# Patient Record
Sex: FEMALE | Race: WHITE | NOT HISPANIC OR LATINO
[De-identification: names, ages, dates, MRNs, and addresses within clinical notes are randomized per-mention and may not be internally consistent; named-entity substitution may affect disease eponyms.]

---

## 2017-10-22 ENCOUNTER — TRANSCRIPTION ENCOUNTER (OUTPATIENT)
Age: 27
End: 2017-10-22

## 2017-10-22 ENCOUNTER — INPATIENT (INPATIENT)
Facility: HOSPITAL | Age: 27
LOS: 1 days | Discharge: HOME | End: 2017-10-24
Attending: SPECIALIST | Admitting: SPECIALIST

## 2017-10-22 VITALS — HEIGHT: 65 IN | WEIGHT: 165.35 LBS

## 2017-10-22 DIAGNOSIS — O26.899 OTHER SPECIFIED PREGNANCY RELATED CONDITIONS, UNSPECIFIED TRIMESTER: ICD-10-CM

## 2017-10-22 DIAGNOSIS — Z3A.00 WEEKS OF GESTATION OF PREGNANCY NOT SPECIFIED: ICD-10-CM

## 2017-10-22 LAB
BASOPHILS # BLD AUTO: 0.01 K/UL — SIGNIFICANT CHANGE UP (ref 0–0.2)
BASOPHILS NFR BLD AUTO: 0.1 % — SIGNIFICANT CHANGE UP (ref 0–2)
BLD GP AB SCN SERPL QL: NEGATIVE — SIGNIFICANT CHANGE UP
EOSINOPHIL # BLD AUTO: 0.02 K/UL — SIGNIFICANT CHANGE UP (ref 0–0.5)
EOSINOPHIL NFR BLD AUTO: 0.2 % — SIGNIFICANT CHANGE UP (ref 0–6)
HCT VFR BLD CALC: 32.5 % — LOW (ref 34.5–45)
HGB BLD-MCNC: 10.2 G/DL — LOW (ref 11.5–15.5)
IMM GRANULOCYTES # BLD AUTO: 0.07 # — SIGNIFICANT CHANGE UP
IMM GRANULOCYTES NFR BLD AUTO: 0.6 % — SIGNIFICANT CHANGE UP (ref 0–1.5)
LYMPHOCYTES # BLD AUTO: 1.83 K/UL — SIGNIFICANT CHANGE UP (ref 1–3.3)
LYMPHOCYTES # BLD AUTO: 16.8 % — SIGNIFICANT CHANGE UP (ref 13–44)
MCHC RBC-ENTMCNC: 24.2 PG — LOW (ref 27–34)
MCHC RBC-ENTMCNC: 31.4 % — LOW (ref 32–36)
MCV RBC AUTO: 77 FL — LOW (ref 80–100)
MONOCYTES # BLD AUTO: 0.61 K/UL — SIGNIFICANT CHANGE UP (ref 0–0.9)
MONOCYTES NFR BLD AUTO: 5.6 % — SIGNIFICANT CHANGE UP (ref 2–14)
NEUTROPHILS # BLD AUTO: 8.35 K/UL — HIGH (ref 1.8–7.4)
NEUTROPHILS NFR BLD AUTO: 76.7 % — SIGNIFICANT CHANGE UP (ref 43–77)
NRBC # FLD: 0 — SIGNIFICANT CHANGE UP
PLATELET # BLD AUTO: 178 K/UL — SIGNIFICANT CHANGE UP (ref 150–400)
PMV BLD: SIGNIFICANT CHANGE UP FL (ref 7–13)
RBC # BLD: 4.22 M/UL — SIGNIFICANT CHANGE UP (ref 3.8–5.2)
RBC # FLD: 14 % — SIGNIFICANT CHANGE UP (ref 10.3–14.5)
RH IG SCN BLD-IMP: POSITIVE — SIGNIFICANT CHANGE UP
RH IG SCN BLD-IMP: POSITIVE — SIGNIFICANT CHANGE UP
WBC # BLD: 10.89 K/UL — HIGH (ref 3.8–10.5)
WBC # FLD AUTO: 10.89 K/UL — HIGH (ref 3.8–10.5)

## 2017-10-22 RX ORDER — OXYTOCIN 10 UNIT/ML
333.33 VIAL (ML) INJECTION
Qty: 20 | Refills: 0 | Status: COMPLETED | OUTPATIENT
Start: 2017-10-22 | End: 2017-10-22

## 2017-10-22 RX ORDER — OXYCODONE HYDROCHLORIDE 5 MG/1
5 TABLET ORAL EVERY 4 HOURS
Qty: 0 | Refills: 0 | Status: DISCONTINUED | OUTPATIENT
Start: 2017-10-22 | End: 2017-10-24

## 2017-10-22 RX ORDER — ACETAMINOPHEN 500 MG
975 TABLET ORAL EVERY 6 HOURS
Qty: 0 | Refills: 0 | Status: COMPLETED | OUTPATIENT
Start: 2017-10-22 | End: 2018-09-20

## 2017-10-22 RX ORDER — CITRIC ACID/SODIUM CITRATE 300-500 MG
15 SOLUTION, ORAL ORAL EVERY 4 HOURS
Qty: 0 | Refills: 0 | Status: DISCONTINUED | OUTPATIENT
Start: 2017-10-22 | End: 2017-10-22

## 2017-10-22 RX ORDER — AER TRAVELER 0.5 G/1
1 SOLUTION RECTAL; TOPICAL EVERY 4 HOURS
Qty: 0 | Refills: 0 | Status: DISCONTINUED | OUTPATIENT
Start: 2017-10-22 | End: 2017-10-24

## 2017-10-22 RX ORDER — DIBUCAINE 1 %
1 OINTMENT (GRAM) RECTAL EVERY 4 HOURS
Qty: 0 | Refills: 0 | Status: DISCONTINUED | OUTPATIENT
Start: 2017-10-22 | End: 2017-10-22

## 2017-10-22 RX ORDER — MAGNESIUM HYDROXIDE 400 MG/1
30 TABLET, CHEWABLE ORAL
Qty: 0 | Refills: 0 | Status: DISCONTINUED | OUTPATIENT
Start: 2017-10-22 | End: 2017-10-24

## 2017-10-22 RX ORDER — PRAMOXINE HYDROCHLORIDE 150 MG/15G
1 AEROSOL, FOAM RECTAL EVERY 4 HOURS
Qty: 0 | Refills: 0 | Status: DISCONTINUED | OUTPATIENT
Start: 2017-10-22 | End: 2017-10-22

## 2017-10-22 RX ORDER — GLYCERIN ADULT
1 SUPPOSITORY, RECTAL RECTAL AT BEDTIME
Qty: 0 | Refills: 0 | Status: DISCONTINUED | OUTPATIENT
Start: 2017-10-22 | End: 2017-10-24

## 2017-10-22 RX ORDER — AMPICILLIN TRIHYDRATE 250 MG
CAPSULE ORAL
Qty: 0 | Refills: 0 | Status: DISCONTINUED | OUTPATIENT
Start: 2017-10-22 | End: 2017-10-22

## 2017-10-22 RX ORDER — IBUPROFEN 200 MG
600 TABLET ORAL EVERY 6 HOURS
Qty: 0 | Refills: 0 | Status: COMPLETED | OUTPATIENT
Start: 2017-10-22 | End: 2018-09-20

## 2017-10-22 RX ORDER — LANOLIN
1 OINTMENT (GRAM) TOPICAL EVERY 6 HOURS
Qty: 0 | Refills: 0 | Status: DISCONTINUED | OUTPATIENT
Start: 2017-10-22 | End: 2017-10-24

## 2017-10-22 RX ORDER — SIMETHICONE 80 MG/1
80 TABLET, CHEWABLE ORAL EVERY 6 HOURS
Qty: 0 | Refills: 0 | Status: DISCONTINUED | OUTPATIENT
Start: 2017-10-22 | End: 2017-10-24

## 2017-10-22 RX ORDER — PRAMOXINE HYDROCHLORIDE 150 MG/15G
1 AEROSOL, FOAM RECTAL EVERY 4 HOURS
Qty: 0 | Refills: 0 | Status: DISCONTINUED | OUTPATIENT
Start: 2017-10-22 | End: 2017-10-23

## 2017-10-22 RX ORDER — SODIUM CHLORIDE 9 MG/ML
1000 INJECTION, SOLUTION INTRAVENOUS ONCE
Qty: 0 | Refills: 0 | Status: DISCONTINUED | OUTPATIENT
Start: 2017-10-22 | End: 2017-10-22

## 2017-10-22 RX ORDER — HYDROCORTISONE 1 %
1 OINTMENT (GRAM) TOPICAL EVERY 4 HOURS
Qty: 0 | Refills: 0 | Status: DISCONTINUED | OUTPATIENT
Start: 2017-10-22 | End: 2017-10-23

## 2017-10-22 RX ORDER — ACETAMINOPHEN 500 MG
975 TABLET ORAL EVERY 6 HOURS
Qty: 0 | Refills: 0 | Status: DISCONTINUED | OUTPATIENT
Start: 2017-10-22 | End: 2017-10-24

## 2017-10-22 RX ORDER — AER TRAVELER 0.5 G/1
1 SOLUTION RECTAL; TOPICAL EVERY 4 HOURS
Qty: 0 | Refills: 0 | Status: DISCONTINUED | OUTPATIENT
Start: 2017-10-22 | End: 2017-10-22

## 2017-10-22 RX ORDER — AMPICILLIN TRIHYDRATE 250 MG
1 CAPSULE ORAL EVERY 4 HOURS
Qty: 0 | Refills: 0 | Status: DISCONTINUED | OUTPATIENT
Start: 2017-10-22 | End: 2017-10-23

## 2017-10-22 RX ORDER — AMPICILLIN TRIHYDRATE 250 MG
CAPSULE ORAL
Qty: 0 | Refills: 0 | Status: DISCONTINUED | OUTPATIENT
Start: 2017-10-22 | End: 2017-10-23

## 2017-10-22 RX ORDER — DIPHENHYDRAMINE HCL 50 MG
25 CAPSULE ORAL EVERY 6 HOURS
Qty: 0 | Refills: 0 | Status: DISCONTINUED | OUTPATIENT
Start: 2017-10-22 | End: 2017-10-24

## 2017-10-22 RX ORDER — HYDROCORTISONE 1 %
1 OINTMENT (GRAM) TOPICAL EVERY 4 HOURS
Qty: 0 | Refills: 0 | Status: DISCONTINUED | OUTPATIENT
Start: 2017-10-22 | End: 2017-10-22

## 2017-10-22 RX ORDER — DOCUSATE SODIUM 100 MG
100 CAPSULE ORAL
Qty: 0 | Refills: 0 | Status: DISCONTINUED | OUTPATIENT
Start: 2017-10-22 | End: 2017-10-24

## 2017-10-22 RX ORDER — AMPICILLIN TRIHYDRATE 250 MG
2 CAPSULE ORAL ONCE
Qty: 0 | Refills: 0 | Status: COMPLETED | OUTPATIENT
Start: 2017-10-22 | End: 2017-10-22

## 2017-10-22 RX ORDER — OXYTOCIN 10 UNIT/ML
41.67 VIAL (ML) INJECTION
Qty: 20 | Refills: 0 | Status: DISCONTINUED | OUTPATIENT
Start: 2017-10-22 | End: 2017-10-22

## 2017-10-22 RX ORDER — SODIUM CHLORIDE 9 MG/ML
1000 INJECTION, SOLUTION INTRAVENOUS
Qty: 0 | Refills: 0 | Status: DISCONTINUED | OUTPATIENT
Start: 2017-10-22 | End: 2017-10-22

## 2017-10-22 RX ORDER — TETANUS TOXOID, REDUCED DIPHTHERIA TOXOID AND ACELLULAR PERTUSSIS VACCINE, ADSORBED 5; 2.5; 8; 8; 2.5 [IU]/.5ML; [IU]/.5ML; UG/.5ML; UG/.5ML; UG/.5ML
0.5 SUSPENSION INTRAMUSCULAR ONCE
Qty: 0 | Refills: 0 | Status: DISCONTINUED | OUTPATIENT
Start: 2017-10-22 | End: 2017-10-24

## 2017-10-22 RX ORDER — IBUPROFEN 200 MG
600 TABLET ORAL EVERY 6 HOURS
Qty: 0 | Refills: 0 | Status: DISCONTINUED | OUTPATIENT
Start: 2017-10-22 | End: 2017-10-24

## 2017-10-22 RX ORDER — SODIUM CHLORIDE 9 MG/ML
3 INJECTION INTRAMUSCULAR; INTRAVENOUS; SUBCUTANEOUS EVERY 8 HOURS
Qty: 0 | Refills: 0 | Status: DISCONTINUED | OUTPATIENT
Start: 2017-10-22 | End: 2017-10-22

## 2017-10-22 RX ORDER — OXYTOCIN 10 UNIT/ML
333.33 VIAL (ML) INJECTION
Qty: 20 | Refills: 0 | Status: COMPLETED | OUTPATIENT
Start: 2017-10-22

## 2017-10-22 RX ORDER — OXYCODONE HYDROCHLORIDE 5 MG/1
5 TABLET ORAL
Qty: 0 | Refills: 0 | Status: DISCONTINUED | OUTPATIENT
Start: 2017-10-22 | End: 2017-10-24

## 2017-10-22 RX ORDER — SODIUM CHLORIDE 9 MG/ML
3 INJECTION INTRAMUSCULAR; INTRAVENOUS; SUBCUTANEOUS EVERY 8 HOURS
Qty: 0 | Refills: 0 | Status: DISCONTINUED | OUTPATIENT
Start: 2017-10-22 | End: 2017-10-23

## 2017-10-22 RX ORDER — KETOROLAC TROMETHAMINE 30 MG/ML
30 SYRINGE (ML) INJECTION ONCE
Qty: 0 | Refills: 0 | Status: DISCONTINUED | OUTPATIENT
Start: 2017-10-22 | End: 2017-10-23

## 2017-10-22 RX ORDER — DIBUCAINE 1 %
1 OINTMENT (GRAM) RECTAL EVERY 4 HOURS
Qty: 0 | Refills: 0 | Status: DISCONTINUED | OUTPATIENT
Start: 2017-10-22 | End: 2017-10-24

## 2017-10-22 RX ORDER — INFLUENZA VIRUS VACCINE 15; 15; 15; 15 UG/.5ML; UG/.5ML; UG/.5ML; UG/.5ML
0.5 SUSPENSION INTRAMUSCULAR ONCE
Qty: 0 | Refills: 0 | Status: DISCONTINUED | OUTPATIENT
Start: 2017-10-22 | End: 2017-10-24

## 2017-10-22 RX ORDER — OXYTOCIN 10 UNIT/ML
41.67 VIAL (ML) INJECTION
Qty: 20 | Refills: 0 | Status: DISCONTINUED | OUTPATIENT
Start: 2017-10-22 | End: 2017-10-23

## 2017-10-22 RX ADMIN — SODIUM CHLORIDE 125 MILLILITER(S): 9 INJECTION, SOLUTION INTRAVENOUS at 19:32

## 2017-10-22 RX ADMIN — Medication 208 GRAM(S): at 20:15

## 2017-10-22 RX ADMIN — Medication 216 GRAM(S): at 15:18

## 2017-10-22 RX ADMIN — SODIUM CHLORIDE 125 MILLILITER(S): 9 INJECTION, SOLUTION INTRAVENOUS at 15:41

## 2017-10-22 RX ADMIN — Medication 1000 MILLIUNIT(S)/MIN: at 21:39

## 2017-10-22 RX ADMIN — Medication 125 MILLIUNIT(S)/MIN: at 21:38

## 2017-10-22 NOTE — DISCHARGE NOTE OB - PATIENT PORTAL LINK FT
“You can access the FollowHealth Patient Portal, offered by [facility], by registering with the following website: [email or web address]”

## 2017-10-22 NOTE — DISCHARGE NOTE OB - MATERIALS PROVIDED
Guide to Postpartum Care/[place]  Immunization Record/Breastfeeding Log/MMR Vaccination (VIS Pub Date: 2012)/Tdap Vaccination (VIS Pub Date: 2012)/Birth Certificate Instructions/[facility] Hearing Screen Program/Shaken Baby Prevention Handout/Vaccinations/[facility] [place] Screening Program

## 2017-10-22 NOTE — DISCHARGE NOTE OB - CARE PROVIDER_API CALL
Naman Marinelli (MD), Obstetrics and Gynecology  [address] [place] [address]  Phone: [phone]  Fax: [phone]

## 2017-10-23 LAB — T PALLIDUM AB TITR SER: NEGATIVE — SIGNIFICANT CHANGE UP

## 2017-10-23 RX ORDER — ACETAMINOPHEN 500 MG
3 TABLET ORAL
Qty: 0 | Refills: 0 | DISCHARGE
Start: 2017-10-23

## 2017-10-23 RX ORDER — PRAMOXINE HYDROCHLORIDE 150 MG/15G
1 AEROSOL, FOAM RECTAL EVERY 4 HOURS
Qty: 0 | Refills: 0 | Status: DISCONTINUED | OUTPATIENT
Start: 2017-10-23 | End: 2017-10-24

## 2017-10-23 RX ORDER — HYDROCORTISONE 1 %
1 OINTMENT (GRAM) TOPICAL EVERY 4 HOURS
Qty: 0 | Refills: 0 | Status: DISCONTINUED | OUTPATIENT
Start: 2017-10-23 | End: 2017-10-24

## 2017-10-23 RX ORDER — IBUPROFEN 200 MG
1 TABLET ORAL
Qty: 0 | Refills: 0 | DISCHARGE
Start: 2017-10-23

## 2017-10-23 RX ADMIN — Medication 975 MILLIGRAM(S): at 15:45

## 2017-10-23 RX ADMIN — Medication 600 MILLIGRAM(S): at 02:40

## 2017-10-23 RX ADMIN — SODIUM CHLORIDE 3 MILLILITER(S): 9 INJECTION INTRAMUSCULAR; INTRAVENOUS; SUBCUTANEOUS at 06:51

## 2017-10-23 RX ADMIN — Medication 600 MILLIGRAM(S): at 09:00

## 2017-10-23 RX ADMIN — Medication 975 MILLIGRAM(S): at 08:30

## 2017-10-23 RX ADMIN — Medication 600 MILLIGRAM(S): at 15:19

## 2017-10-23 RX ADMIN — Medication 600 MILLIGRAM(S): at 21:30

## 2017-10-23 RX ADMIN — Medication 600 MILLIGRAM(S): at 15:45

## 2017-10-23 RX ADMIN — Medication 600 MILLIGRAM(S): at 08:30

## 2017-10-23 RX ADMIN — Medication 975 MILLIGRAM(S): at 09:00

## 2017-10-23 RX ADMIN — Medication 975 MILLIGRAM(S): at 21:30

## 2017-10-23 RX ADMIN — Medication 975 MILLIGRAM(S): at 21:00

## 2017-10-23 RX ADMIN — Medication 975 MILLIGRAM(S): at 15:19

## 2017-10-23 RX ADMIN — Medication 1 TABLET(S): at 08:29

## 2017-10-23 RX ADMIN — Medication 600 MILLIGRAM(S): at 02:05

## 2017-10-23 RX ADMIN — Medication 600 MILLIGRAM(S): at 21:00

## 2017-10-23 RX ADMIN — Medication 100 MILLIGRAM(S): at 06:20

## 2017-10-23 NOTE — PROGRESS NOTE ADULT - SUBJECTIVE AND OBJECTIVE BOX
S: Patient doing well. Minimal lochia. Pain controlled. breastfeeding    O: Vital Signs Last 24 Hrs  T(C): 36.7 (23 Oct 2017 06:00), Max: 37 (22 Oct 2017 21:00)  T(F): 98 (23 Oct 2017 06:00), Max: 98.6 (22 Oct 2017 21:00)  HR: 75 (23 Oct 2017 06:00) (75 - 100)  BP: 106/65 (23 Oct 2017 06:00) (106/65 - 120/80)  BP(mean): --  RR: 16 (23 Oct 2017 06:00) (16 - 18)  SpO2: 100% (23 Oct 2017 06:00) (97% - 100%)    Gen: NAD  Abd: soft, NT, ND, fundus firm below umbilicus  Lochia: moderate  Ext: no tenderness    Labs:                        10.2   10.89 )-----------( 178      ( 22 Oct 2017 15:08 )             32.5       ABO Interpretation: A (10-22 @ 15:36)    Rh Interpretation: Positive (10-22 @ 15:36)    Antibody Screen Negative      A: 27y PPD# s/p1  doing well.     Plan: Continue routine postpartum care.

## 2017-10-24 VITALS
OXYGEN SATURATION: 99 % | SYSTOLIC BLOOD PRESSURE: 113 MMHG | DIASTOLIC BLOOD PRESSURE: 65 MMHG | RESPIRATION RATE: 18 BRPM | HEART RATE: 86 BPM | TEMPERATURE: 98 F

## 2017-10-24 RX ADMIN — Medication 975 MILLIGRAM(S): at 03:45

## 2017-10-24 RX ADMIN — Medication 600 MILLIGRAM(S): at 03:45

## 2017-10-24 RX ADMIN — Medication 975 MILLIGRAM(S): at 11:13

## 2017-10-24 RX ADMIN — Medication 600 MILLIGRAM(S): at 04:15

## 2017-10-24 RX ADMIN — Medication 600 MILLIGRAM(S): at 11:14

## 2017-10-24 RX ADMIN — Medication 100 MILLIGRAM(S): at 03:45

## 2017-10-24 RX ADMIN — Medication 975 MILLIGRAM(S): at 04:15

## 2019-03-12 NOTE — DISCHARGE NOTE OB - NS OB DC MMR REASON NOT RECEIVED
Pt is requesting office change to [place]  Please send over records for Dr Sea Pastrana to review  Contraindicated

## 2019-04-02 PROBLEM — Z00.00 ENCOUNTER FOR PREVENTIVE HEALTH EXAMINATION: Status: ACTIVE | Noted: 2019-04-02

## 2019-05-02 ENCOUNTER — APPOINTMENT (OUTPATIENT)
Dept: ANTEPARTUM | Facility: CLINIC | Age: 29
End: 2019-05-02
Payer: COMMERCIAL

## 2019-05-02 ENCOUNTER — ASOB RESULT (OUTPATIENT)
Age: 29
End: 2019-05-02

## 2019-05-02 PROCEDURE — 36416 COLLJ CAPILLARY BLOOD SPEC: CPT

## 2019-05-02 PROCEDURE — 76801 OB US < 14 WKS SINGLE FETUS: CPT

## 2019-05-02 PROCEDURE — 76813 OB US NUCHAL MEAS 1 GEST: CPT

## 2019-06-11 ENCOUNTER — APPOINTMENT (OUTPATIENT)
Dept: ANTEPARTUM | Facility: CLINIC | Age: 29
End: 2019-06-11
Payer: COMMERCIAL

## 2019-06-11 ENCOUNTER — ASOB RESULT (OUTPATIENT)
Age: 29
End: 2019-06-11

## 2019-06-11 PROCEDURE — 76811 OB US DETAILED SNGL FETUS: CPT

## 2019-06-17 ENCOUNTER — APPOINTMENT (OUTPATIENT)
Dept: ANTEPARTUM | Facility: CLINIC | Age: 29
End: 2019-06-17

## 2019-09-17 ENCOUNTER — ASOB RESULT (OUTPATIENT)
Age: 29
End: 2019-09-17

## 2019-09-17 ENCOUNTER — APPOINTMENT (OUTPATIENT)
Dept: ANTEPARTUM | Facility: CLINIC | Age: 29
End: 2019-09-17
Payer: COMMERCIAL

## 2019-09-17 ENCOUNTER — OUTPATIENT (OUTPATIENT)
Dept: OUTPATIENT SERVICES | Facility: HOSPITAL | Age: 29
LOS: 1 days | Discharge: HOME | End: 2019-09-17

## 2019-09-17 VITALS
SYSTOLIC BLOOD PRESSURE: 118 MMHG | TEMPERATURE: 98 F | RESPIRATION RATE: 16 BRPM | HEART RATE: 90 BPM | OXYGEN SATURATION: 99 % | DIASTOLIC BLOOD PRESSURE: 74 MMHG

## 2019-09-17 VITALS — DIASTOLIC BLOOD PRESSURE: 58 MMHG | SYSTOLIC BLOOD PRESSURE: 97 MMHG | HEART RATE: 75 BPM

## 2019-09-17 DIAGNOSIS — O26.899 OTHER SPECIFIED PREGNANCY RELATED CONDITIONS, UNSPECIFIED TRIMESTER: ICD-10-CM

## 2019-09-17 DIAGNOSIS — Z3A.00 WEEKS OF GESTATION OF PREGNANCY NOT SPECIFIED: ICD-10-CM

## 2019-09-17 LAB
APPEARANCE UR: CLEAR — SIGNIFICANT CHANGE UP
BACTERIA # UR AUTO: NEGATIVE — SIGNIFICANT CHANGE UP
BILIRUB UR-MCNC: NEGATIVE — SIGNIFICANT CHANGE UP
BLOOD UR QL VISUAL: NEGATIVE — SIGNIFICANT CHANGE UP
COLOR SPEC: SIGNIFICANT CHANGE UP
GLUCOSE UR-MCNC: NEGATIVE — SIGNIFICANT CHANGE UP
HYALINE CASTS # UR AUTO: NEGATIVE — SIGNIFICANT CHANGE UP
KETONES UR-MCNC: NEGATIVE — SIGNIFICANT CHANGE UP
LEUKOCYTE ESTERASE UR-ACNC: SIGNIFICANT CHANGE UP
NITRITE UR-MCNC: NEGATIVE — SIGNIFICANT CHANGE UP
PH UR: 7 — SIGNIFICANT CHANGE UP (ref 5–8)
PROT UR-MCNC: NEGATIVE — SIGNIFICANT CHANGE UP
RBC CASTS # UR COMP ASSIST: SIGNIFICANT CHANGE UP (ref 0–?)
SP GR SPEC: 1.01 — SIGNIFICANT CHANGE UP (ref 1–1.04)
SQUAMOUS # UR AUTO: SIGNIFICANT CHANGE UP
UROBILINOGEN FLD QL: NORMAL — SIGNIFICANT CHANGE UP
WBC UR QL: HIGH (ref 0–?)

## 2019-09-17 PROCEDURE — 76819 FETAL BIOPHYS PROFIL W/O NST: CPT

## 2019-09-17 PROCEDURE — 76816 OB US FOLLOW-UP PER FETUS: CPT

## 2019-09-17 NOTE — OB PROVIDER TRIAGE NOTE - HISTORY OF PRESENT ILLNESS
30 yo @ 33.1 wks  presents with c/o B/L lower back pain starting at approx. 0700 this AM and continuing intermittently x 2 hours resolving around 0900.   Patient denies any CTX,  LOF, VB, and reports good FM's  PNC: Dr Marinelli

## 2019-09-17 NOTE — OB PROVIDER TRIAGE NOTE - NSHPPHYSICALEXAM_GEN_ALL_CORE
A/O x 3/ NAD  ICU Vital Signs Last 24 Hrs  T(C): 36.8 (17 Sep 2019 09:38), Max: 36.8 (17 Sep 2019 09:11)  T(F): 98.24 (17 Sep 2019 09:38), Max: 98.24 (17 Sep 2019 09:38)  HR: 80 (17 Sep 2019 09:37) (80 - 90)  BP: 114/75 (17 Sep 2019 09:37) (114/75 - 118/74)  BP(mean): --  ABP: --  ABP(mean): --  RR: 16 (17 Sep 2019 09:11) (16 - 16)  SpO2: 99% (17 Sep 2019 09:11) (99% - 99%)  Abdomen is soft NT and gravid with no ctx palpated   B/L Negative CVAT  SSE: No Pooling, Cervix appears closed  TVS = 4.0 cm with no dynamic change  NST in Progress

## 2019-09-17 NOTE — OB PROVIDER TRIAGE NOTE - NSHPLABSRESULTS_GEN_ALL_CORE
U/A U/A  Urinalysis Basic - ( 17 Sep 2019 09:42 )    Color: LIGHT YELLOW / Appearance: CLEAR / S.009 / pH: 7.0  Gluc: NEGATIVE / Ketone: NEGATIVE  / Bili: NEGATIVE / Urobili: NORMAL   Blood: NEGATIVE / Protein: NEGATIVE / Nitrite: NEGATIVE   Leuk Esterase: SMALL / RBC: 0-2 / WBC 6-10   Sq Epi: FEW / Non Sq Epi: x / Bacteria: NEGATIVE      Urine Culture sent , will follow up with patient or Primary OB if abnormal    FHR: Cat 1, Reactive  TOCO: NO CTX

## 2019-09-17 NOTE — OB PROVIDER TRIAGE NOTE - NSOBPROVIDERNOTE_OBGYN_ALL_OB_FT
30 yo @ wks GA with c/o B/L intermittent lower back pain x approx.. 2 hours this AM which has since resolved upon arriving here in DT.   NST in Progress  TVS = 4.0 CM   U/A sent 28 yo @ wks GA with c/o B/L intermittent lower back pain x approx.. 2 hours this AM which has since resolved upon arriving here in DT.   NST in Progress  TVS = 4.0 CM   U/A sent with Urine CX    - No evidence of PTL at this time or any acute pathology  - Plan for Discharge home with precautions and advised to increase PO Fluids  - A/P DW DR Russell (OB Covering Attending)

## 2019-09-18 LAB — SPECIMEN SOURCE: SIGNIFICANT CHANGE UP

## 2019-09-19 LAB — BACTERIA UR CULT: SIGNIFICANT CHANGE UP

## 2019-10-29 ENCOUNTER — INPATIENT (INPATIENT)
Facility: HOSPITAL | Age: 29
LOS: 1 days | Discharge: HOME | End: 2019-10-31
Attending: SPECIALIST | Admitting: SPECIALIST

## 2019-10-29 VITALS
TEMPERATURE: 98 F | DIASTOLIC BLOOD PRESSURE: 79 MMHG | SYSTOLIC BLOOD PRESSURE: 121 MMHG | HEART RATE: 93 BPM | RESPIRATION RATE: 1 BRPM

## 2019-10-29 DIAGNOSIS — O26.899 OTHER SPECIFIED PREGNANCY RELATED CONDITIONS, UNSPECIFIED TRIMESTER: ICD-10-CM

## 2019-10-29 DIAGNOSIS — Z3A.00 WEEKS OF GESTATION OF PREGNANCY NOT SPECIFIED: ICD-10-CM

## 2019-10-29 LAB
BASOPHILS # BLD AUTO: 0.03 K/UL — SIGNIFICANT CHANGE UP (ref 0–0.2)
BASOPHILS NFR BLD AUTO: 0.3 % — SIGNIFICANT CHANGE UP (ref 0–2)
BLD GP AB SCN SERPL QL: NEGATIVE — SIGNIFICANT CHANGE UP
EOSINOPHIL # BLD AUTO: 0.03 K/UL — SIGNIFICANT CHANGE UP (ref 0–0.5)
EOSINOPHIL NFR BLD AUTO: 0.3 % — SIGNIFICANT CHANGE UP (ref 0–6)
HCT VFR BLD CALC: 31.7 % — LOW (ref 34.5–45)
HGB BLD-MCNC: 9.8 G/DL — LOW (ref 11.5–15.5)
IMM GRANULOCYTES NFR BLD AUTO: 0.6 % — SIGNIFICANT CHANGE UP (ref 0–1.5)
LYMPHOCYTES # BLD AUTO: 1.98 K/UL — SIGNIFICANT CHANGE UP (ref 1–3.3)
LYMPHOCYTES # BLD AUTO: 18 % — SIGNIFICANT CHANGE UP (ref 13–44)
MCHC RBC-ENTMCNC: 23.1 PG — LOW (ref 27–34)
MCHC RBC-ENTMCNC: 30.9 % — LOW (ref 32–36)
MCV RBC AUTO: 74.6 FL — LOW (ref 80–100)
MONOCYTES # BLD AUTO: 0.78 K/UL — SIGNIFICANT CHANGE UP (ref 0–0.9)
MONOCYTES NFR BLD AUTO: 7.1 % — SIGNIFICANT CHANGE UP (ref 2–14)
NEUTROPHILS # BLD AUTO: 8.11 K/UL — HIGH (ref 1.8–7.4)
NEUTROPHILS NFR BLD AUTO: 73.7 % — SIGNIFICANT CHANGE UP (ref 43–77)
NRBC # FLD: 0 K/UL — SIGNIFICANT CHANGE UP (ref 0–0)
PLATELET # BLD AUTO: 201 K/UL — SIGNIFICANT CHANGE UP (ref 150–400)
PMV BLD: SIGNIFICANT CHANGE UP FL (ref 7–13)
RBC # BLD: 4.25 M/UL — SIGNIFICANT CHANGE UP (ref 3.8–5.2)
RBC # FLD: 14.9 % — HIGH (ref 10.3–14.5)
RH IG SCN BLD-IMP: POSITIVE — SIGNIFICANT CHANGE UP
T PALLIDUM AB TITR SER: NEGATIVE — SIGNIFICANT CHANGE UP
WBC # BLD: 11 K/UL — HIGH (ref 3.8–10.5)
WBC # FLD AUTO: 11 K/UL — HIGH (ref 3.8–10.5)

## 2019-10-29 RX ORDER — AER TRAVELER 0.5 G/1
1 SOLUTION RECTAL; TOPICAL EVERY 4 HOURS
Refills: 0 | Status: DISCONTINUED | OUTPATIENT
Start: 2019-10-29 | End: 2019-10-31

## 2019-10-29 RX ORDER — BENZOCAINE 10 %
1 GEL (GRAM) MUCOUS MEMBRANE EVERY 6 HOURS
Refills: 0 | Status: DISCONTINUED | OUTPATIENT
Start: 2019-10-29 | End: 2019-10-31

## 2019-10-29 RX ORDER — IBUPROFEN 200 MG
600 TABLET ORAL EVERY 6 HOURS
Refills: 0 | Status: DISCONTINUED | OUTPATIENT
Start: 2019-10-29 | End: 2019-10-31

## 2019-10-29 RX ORDER — SIMETHICONE 80 MG/1
80 TABLET, CHEWABLE ORAL EVERY 4 HOURS
Refills: 0 | Status: DISCONTINUED | OUTPATIENT
Start: 2019-10-29 | End: 2019-10-31

## 2019-10-29 RX ORDER — HYDROCORTISONE 1 %
1 OINTMENT (GRAM) TOPICAL EVERY 6 HOURS
Refills: 0 | Status: DISCONTINUED | OUTPATIENT
Start: 2019-10-29 | End: 2019-10-31

## 2019-10-29 RX ORDER — GLYCERIN ADULT
1 SUPPOSITORY, RECTAL RECTAL AT BEDTIME
Refills: 0 | Status: DISCONTINUED | OUTPATIENT
Start: 2019-10-29 | End: 2019-10-31

## 2019-10-29 RX ORDER — INFLUENZA VIRUS VACCINE 15; 15; 15; 15 UG/.5ML; UG/.5ML; UG/.5ML; UG/.5ML
0.5 SUSPENSION INTRAMUSCULAR ONCE
Refills: 0 | Status: DISCONTINUED | OUTPATIENT
Start: 2019-10-29 | End: 2019-10-31

## 2019-10-29 RX ORDER — OXYCODONE HYDROCHLORIDE 5 MG/1
5 TABLET ORAL
Refills: 0 | Status: DISCONTINUED | OUTPATIENT
Start: 2019-10-29 | End: 2019-10-31

## 2019-10-29 RX ORDER — MAGNESIUM HYDROXIDE 400 MG/1
30 TABLET, CHEWABLE ORAL
Refills: 0 | Status: DISCONTINUED | OUTPATIENT
Start: 2019-10-29 | End: 2019-10-31

## 2019-10-29 RX ORDER — DIBUCAINE 1 %
1 OINTMENT (GRAM) RECTAL EVERY 6 HOURS
Refills: 0 | Status: DISCONTINUED | OUTPATIENT
Start: 2019-10-29 | End: 2019-10-31

## 2019-10-29 RX ORDER — OXYTOCIN 10 UNIT/ML
333.33 VIAL (ML) INJECTION
Qty: 20 | Refills: 0 | Status: DISCONTINUED | OUTPATIENT
Start: 2019-10-29 | End: 2019-10-29

## 2019-10-29 RX ORDER — SODIUM CHLORIDE 9 MG/ML
1000 INJECTION, SOLUTION INTRAVENOUS ONCE
Refills: 0 | Status: COMPLETED | OUTPATIENT
Start: 2019-10-29 | End: 2019-10-29

## 2019-10-29 RX ORDER — LANOLIN
1 OINTMENT (GRAM) TOPICAL EVERY 6 HOURS
Refills: 0 | Status: DISCONTINUED | OUTPATIENT
Start: 2019-10-29 | End: 2019-10-31

## 2019-10-29 RX ORDER — DIPHENHYDRAMINE HCL 50 MG
25 CAPSULE ORAL EVERY 6 HOURS
Refills: 0 | Status: DISCONTINUED | OUTPATIENT
Start: 2019-10-29 | End: 2019-10-31

## 2019-10-29 RX ORDER — IBUPROFEN 200 MG
600 TABLET ORAL EVERY 6 HOURS
Refills: 0 | Status: COMPLETED | OUTPATIENT
Start: 2019-10-29 | End: 2020-09-26

## 2019-10-29 RX ORDER — ACETAMINOPHEN 500 MG
975 TABLET ORAL
Refills: 0 | Status: DISCONTINUED | OUTPATIENT
Start: 2019-10-29 | End: 2019-10-31

## 2019-10-29 RX ORDER — SODIUM CHLORIDE 9 MG/ML
3 INJECTION INTRAMUSCULAR; INTRAVENOUS; SUBCUTANEOUS EVERY 8 HOURS
Refills: 0 | Status: DISCONTINUED | OUTPATIENT
Start: 2019-10-29 | End: 2019-10-31

## 2019-10-29 RX ORDER — PRAMOXINE HYDROCHLORIDE 150 MG/15G
1 AEROSOL, FOAM RECTAL EVERY 4 HOURS
Refills: 0 | Status: DISCONTINUED | OUTPATIENT
Start: 2019-10-29 | End: 2019-10-31

## 2019-10-29 RX ORDER — KETOROLAC TROMETHAMINE 30 MG/ML
30 SYRINGE (ML) INJECTION ONCE
Refills: 0 | Status: DISCONTINUED | OUTPATIENT
Start: 2019-10-29 | End: 2019-10-29

## 2019-10-29 RX ORDER — FAMOTIDINE 10 MG/ML
0 INJECTION INTRAVENOUS
Qty: 0 | Refills: 0 | DISCHARGE

## 2019-10-29 RX ORDER — SODIUM CHLORIDE 9 MG/ML
1000 INJECTION, SOLUTION INTRAVENOUS
Refills: 0 | Status: DISCONTINUED | OUTPATIENT
Start: 2019-10-29 | End: 2019-10-29

## 2019-10-29 RX ORDER — TETANUS TOXOID, REDUCED DIPHTHERIA TOXOID AND ACELLULAR PERTUSSIS VACCINE, ADSORBED 5; 2.5; 8; 8; 2.5 [IU]/.5ML; [IU]/.5ML; UG/.5ML; UG/.5ML; UG/.5ML
0.5 SUSPENSION INTRAMUSCULAR ONCE
Refills: 0 | Status: DISCONTINUED | OUTPATIENT
Start: 2019-10-29 | End: 2019-10-31

## 2019-10-29 RX ORDER — OXYCODONE HYDROCHLORIDE 5 MG/1
5 TABLET ORAL ONCE
Refills: 0 | Status: DISCONTINUED | OUTPATIENT
Start: 2019-10-29 | End: 2019-10-31

## 2019-10-29 RX ADMIN — Medication 600 MILLIGRAM(S): at 23:30

## 2019-10-29 RX ADMIN — Medication 30 MILLIGRAM(S): at 17:30

## 2019-10-29 RX ADMIN — Medication 975 MILLIGRAM(S): at 20:10

## 2019-10-29 RX ADMIN — OXYCODONE HYDROCHLORIDE 5 MILLIGRAM(S): 5 TABLET ORAL at 23:47

## 2019-10-29 RX ADMIN — Medication 1000 MILLIUNIT(S)/MIN: at 17:37

## 2019-10-29 RX ADMIN — SODIUM CHLORIDE 1000 MILLILITER(S): 9 INJECTION, SOLUTION INTRAVENOUS at 15:15

## 2019-10-29 RX ADMIN — SODIUM CHLORIDE 3 MILLILITER(S): 9 INJECTION INTRAMUSCULAR; INTRAVENOUS; SUBCUTANEOUS at 22:30

## 2019-10-29 RX ADMIN — Medication 30 MILLIGRAM(S): at 17:00

## 2019-10-29 RX ADMIN — Medication 975 MILLIGRAM(S): at 20:40

## 2019-10-29 RX ADMIN — SODIUM CHLORIDE 125 MILLILITER(S): 9 INJECTION, SOLUTION INTRAVENOUS at 11:29

## 2019-10-29 RX ADMIN — Medication 600 MILLIGRAM(S): at 22:59

## 2019-10-29 NOTE — OB PROVIDER H&P - HISTORY OF PRESENT ILLNESS
30yo  @ 39.1 wks SLIUP uncomp PNC here co lower back pain every 5-6 min apart with spotting earlier today. Pt is intact with GFM. Pt reports last VE Wed 10/23 was closed.    Pmhx-denies  Pshx/Hosp-denies  Meds-PNV  NKDA  Past [id number] FT-7#2  Gyn-denies

## 2019-10-29 NOTE — OB PROVIDER DELIVERY SUMMARY - NSPROVIDERDELIVERYNOTE_OBGYN_ALL_OB_FT
Spontaneous vaginal delivery of liveborn infant  from MICKI position. Head, shoulders, and  body delivered easily. Infant was suctioned.  No mec. Cord was clamped and cut and  infant was passed to mother. Placenta  delivered intact with a 3 vessel cord. Fundal  massage was given and uterine fundus was  found to be firm. Vaginal exam revealed an  intact cervix, vaginal walls and sulci. Patient  had a 2nd degree laceration in the perineum  that was repaired with 2.0 chromic suture.  Excellent hemostasis was noted. patient  was stable and went to recovery. Count was  correct x 2.

## 2019-10-29 NOTE — OB PROVIDER TRIAGE NOTE - NSHPPHYSICALEXAM_GEN_ALL_CORE
Abd exam-soft nontender; gravid uterus.   VE-4/80/-2/posterior    EFW~3200 Abd exam-soft nontender; gravid uterus.   VE-4/80/-2/posterior    EFW~3200    NST cat I with accels and mod variability; ctx's Q4-5 min

## 2019-10-29 NOTE — OB PROVIDER TRIAGE NOTE - HISTORY OF PRESENT ILLNESS
28yo  @ 39.1 wks SLIUP uncomp PNC here co lower back pain every 5-6 min apart with spotting earlier today. Pt is intact with GFM. Pt reports last VE Wed 10/23 was closed.    Pmhx-denies  Pshx/Hosp-denies  Meds-PNV  NKDA  Past [id number] FT-7#2  Gyn-denies

## 2019-10-29 NOTE — OB PROVIDER TRIAGE NOTE - NSOBPROVIDERNOTE_OBGYN_ALL_OB_FT
30yo  @ 39.1 wks SLIUP uncomp PNC here for rule out labor  -VE-4/80/-2/posterior  -GBS neg 28yo  @ 39.1 wks SLIUP uncomp PNC here for rule out labor  -VE-4/80/-2/posterior  -GBS neg  -Pt declining pain meds at this time. Pt does not want to ambulate.   -pt was dw dr Gallo, in to see and speak to pt. Pt admitted for labor.

## 2019-10-29 NOTE — OB RN DELIVERY SUMMARY - NS_SEPSISRSKCALC_OBGYN_ALL_OB_FT
EOS calculated successfully. EOS Risk Factor: 0.5/1000 live births ([facility] national incidence); GA=39w1d; Temp=98.24; ROM=2.2; GBS='Negative'; Antibiotics='No antibiotics or any antibiotics < 2 hrs prior to birth'

## 2019-10-29 NOTE — OB PROVIDER H&P - NSHPPHYSICALEXAM_GEN_ALL_CORE
Abd exam-soft nontender; gravid uterus.   VE-4/80/-2/posterior    EFW~3200    NST cat I with accels and mod variability; ctx's Q4-5 min Abd exam-soft nontender; gravid uterus.   VE-4/80/-2/posterior    EFW~3200    NST cat I with accels and mod variability; ctx's Q4-5 min    TAS-to confirm vtx presentation

## 2019-10-29 NOTE — OB PROVIDER H&P - ASSESSMENT
28yo  @ 39.1 wks SLIUP uncomp PNC here for rule out labor  -VE-4/80/-2/posterior  -GBS neg  -Pt declining pain meds at this time. Pt does not want to ambulate.   -pt was dw dr Gallo, in to see and speak to pt. Pt admitted for labor.

## 2019-10-30 RX ADMIN — Medication 975 MILLIGRAM(S): at 17:24

## 2019-10-30 RX ADMIN — Medication 600 MILLIGRAM(S): at 12:30

## 2019-10-30 RX ADMIN — Medication 600 MILLIGRAM(S): at 13:10

## 2019-10-30 RX ADMIN — Medication 975 MILLIGRAM(S): at 11:00

## 2019-10-30 RX ADMIN — Medication 600 MILLIGRAM(S): at 06:30

## 2019-10-30 RX ADMIN — Medication 975 MILLIGRAM(S): at 23:01

## 2019-10-30 RX ADMIN — Medication 975 MILLIGRAM(S): at 16:46

## 2019-10-30 RX ADMIN — Medication 975 MILLIGRAM(S): at 10:19

## 2019-10-30 RX ADMIN — Medication 600 MILLIGRAM(S): at 19:30

## 2019-10-30 RX ADMIN — Medication 600 MILLIGRAM(S): at 05:58

## 2019-10-30 RX ADMIN — Medication 600 MILLIGRAM(S): at 20:15

## 2019-10-30 RX ADMIN — OXYCODONE HYDROCHLORIDE 5 MILLIGRAM(S): 5 TABLET ORAL at 00:20

## 2019-10-30 RX ADMIN — Medication 975 MILLIGRAM(S): at 23:45

## 2019-10-30 NOTE — PROGRESS NOTE ADULT - SUBJECTIVE AND OBJECTIVE BOX
S: Patient doing well. Minimal lochia. Pain controlled.    O: Vital Signs Last 24 Hrs  T(C): 36.3 (30 Oct 2019 06:13), Max: 37.1 (29 Oct 2019 16:25)  T(F): 97.4 (30 Oct 2019 06:13), Max: 98.8 (29 Oct 2019 16:25)  HR: 80 (30 Oct 2019 06:13) (80 - 137)  BP: 110/73 (30 Oct 2019 06:13) (98/66 - 143/66)  BP(mean): --  RR: 17 (30 Oct 2019 06:13) (1 - 19)  SpO2: 99% (30 Oct 2019 06:13) (64% - 100%)    Gen: NAD  Abd: soft, NT, ND, fundus firm below umbilicus  Lochia: moderate  Ext: no tenderness    Labs:                        9.8    11.00 )-----------( 201      ( 29 Oct 2019 10:56 )             31.7       A: 29y PPD# s/p  doing well.    Plan: Routine care> KS home 10/31. Instructions given

## 2019-10-30 NOTE — PROGRESS NOTE ADULT - SUBJECTIVE AND OBJECTIVE BOX
Anesthesia Post-op Note    POD#1 S/P vaginal delivery    Patient is doing well.  OOBAA. Tolerating clears.  Pain is tolerable.  No residual anesthetic issues or complications noted.

## 2019-10-31 ENCOUNTER — TRANSCRIPTION ENCOUNTER (OUTPATIENT)
Age: 29
End: 2019-10-31

## 2019-10-31 VITALS
RESPIRATION RATE: 17 BRPM | TEMPERATURE: 98 F | SYSTOLIC BLOOD PRESSURE: 116 MMHG | HEART RATE: 83 BPM | OXYGEN SATURATION: 99 % | DIASTOLIC BLOOD PRESSURE: 69 MMHG

## 2019-10-31 RX ORDER — IBUPROFEN 200 MG
1 TABLET ORAL
Qty: 0 | Refills: 0 | DISCHARGE
Start: 2019-10-31

## 2019-10-31 RX ORDER — ACETAMINOPHEN 500 MG
3 TABLET ORAL
Qty: 0 | Refills: 0 | DISCHARGE
Start: 2019-10-31

## 2019-10-31 RX ADMIN — Medication 975 MILLIGRAM(S): at 07:54

## 2019-10-31 RX ADMIN — Medication 975 MILLIGRAM(S): at 08:30

## 2019-10-31 RX ADMIN — Medication 600 MILLIGRAM(S): at 11:29

## 2019-10-31 RX ADMIN — Medication 600 MILLIGRAM(S): at 02:44

## 2019-10-31 RX ADMIN — Medication 600 MILLIGRAM(S): at 03:30

## 2019-10-31 NOTE — DISCHARGE NOTE OB - MEDICATION SUMMARY - MEDICATIONS TO TAKE
I will START or STAY ON the medications listed below when I get home from the hospital:    ibuprofen 600 mg oral tablet  -- 1 tab(s) by mouth every 6 hours, As Needed  -- Indication: For Labor and delivery, indication for care    acetaminophen 325 mg oral tablet  -- 3 tab(s) by mouth , As Needed  -- Indication: For Labor and delivery, indication for care    Prenatal Multivitamins with Folic Acid 1 mg oral tablet  -- 1 tab(s) by mouth once a day  -- Indication: For Labor and delivery, indication for care

## 2019-10-31 NOTE — DISCHARGE NOTE OB - PATIENT PORTAL LINK FT
You can access the FollowMyHealth Patient Portal offered by [facility] by registering at the following website: [email or web address]. By joining [employer]’s FollowMyHealth portal, you will also be able to view your health information using other applications (apps) compatible with our system.

## 2019-10-31 NOTE — DISCHARGE NOTE OB - CARE PROVIDER_API CALL
Naman Marinelli)  Obstetrics and Gynecology  [address] [place] [address]  Phone: [phone]  Fax: [phone]  Follow Up Time:

## 2019-11-03 ENCOUNTER — EMERGENCY (EMERGENCY)
Facility: HOSPITAL | Age: 29
LOS: 1 days | Discharge: HOME | End: 2019-11-03
Attending: STUDENT IN AN ORGANIZED HEALTH CARE EDUCATION/TRAINING PROGRAM | Admitting: STUDENT IN AN ORGANIZED HEALTH CARE EDUCATION/TRAINING PROGRAM
Payer: COMMERCIAL

## 2019-11-03 VITALS
TEMPERATURE: 98 F | RESPIRATION RATE: 18 BRPM | DIASTOLIC BLOOD PRESSURE: 91 MMHG | SYSTOLIC BLOOD PRESSURE: 129 MMHG | HEART RATE: 84 BPM | OXYGEN SATURATION: 100 %

## 2019-11-03 VITALS
HEART RATE: 89 BPM | RESPIRATION RATE: 18 BRPM | SYSTOLIC BLOOD PRESSURE: 122 MMHG | TEMPERATURE: 99 F | DIASTOLIC BLOOD PRESSURE: 87 MMHG | OXYGEN SATURATION: 100 %

## 2019-11-03 LAB
ALBUMIN SERPL ELPH-MCNC: 3 G/DL — LOW (ref 3.3–5)
ALP SERPL-CCNC: 83 U/L — SIGNIFICANT CHANGE UP (ref 40–120)
ALT FLD-CCNC: 19 U/L — SIGNIFICANT CHANGE UP (ref 4–33)
ANION GAP SERPL CALC-SCNC: 12 MMO/L — SIGNIFICANT CHANGE UP (ref 7–14)
APPEARANCE UR: CLEAR — SIGNIFICANT CHANGE UP
AST SERPL-CCNC: 21 U/L — SIGNIFICANT CHANGE UP (ref 4–32)
BACTERIA # UR AUTO: NEGATIVE — SIGNIFICANT CHANGE UP
BASOPHILS # BLD AUTO: 0.03 K/UL — SIGNIFICANT CHANGE UP (ref 0–0.2)
BASOPHILS NFR BLD AUTO: 0.3 % — SIGNIFICANT CHANGE UP (ref 0–2)
BILIRUB SERPL-MCNC: 0.2 MG/DL — SIGNIFICANT CHANGE UP (ref 0.2–1.2)
BILIRUB UR-MCNC: NEGATIVE — SIGNIFICANT CHANGE UP
BLOOD UR QL VISUAL: HIGH
BUN SERPL-MCNC: 6 MG/DL — LOW (ref 7–23)
CALCIUM SERPL-MCNC: 8.6 MG/DL — SIGNIFICANT CHANGE UP (ref 8.4–10.5)
CHLORIDE SERPL-SCNC: 108 MMOL/L — HIGH (ref 98–107)
CO2 SERPL-SCNC: 19 MMOL/L — LOW (ref 22–31)
COLOR SPEC: SIGNIFICANT CHANGE UP
CREAT SERPL-MCNC: 0.5 MG/DL — SIGNIFICANT CHANGE UP (ref 0.5–1.3)
EOSINOPHIL # BLD AUTO: 0.21 K/UL — SIGNIFICANT CHANGE UP (ref 0–0.5)
EOSINOPHIL NFR BLD AUTO: 1.8 % — SIGNIFICANT CHANGE UP (ref 0–6)
GLUCOSE SERPL-MCNC: 83 MG/DL — SIGNIFICANT CHANGE UP (ref 70–99)
GLUCOSE UR-MCNC: NEGATIVE — SIGNIFICANT CHANGE UP
HCT VFR BLD CALC: 34.5 % — SIGNIFICANT CHANGE UP (ref 34.5–45)
HGB BLD-MCNC: 9.7 G/DL — LOW (ref 11.5–15.5)
HYALINE CASTS # UR AUTO: NEGATIVE — SIGNIFICANT CHANGE UP
IMM GRANULOCYTES NFR BLD AUTO: 0.7 % — SIGNIFICANT CHANGE UP (ref 0–1.5)
KETONES UR-MCNC: NEGATIVE — SIGNIFICANT CHANGE UP
LEUKOCYTE ESTERASE UR-ACNC: SIGNIFICANT CHANGE UP
LYMPHOCYTES # BLD AUTO: 18.3 % — SIGNIFICANT CHANGE UP (ref 13–44)
LYMPHOCYTES # BLD AUTO: 2.08 K/UL — SIGNIFICANT CHANGE UP (ref 1–3.3)
MCHC RBC-ENTMCNC: 22.1 PG — LOW (ref 27–34)
MCHC RBC-ENTMCNC: 28.1 % — LOW (ref 32–36)
MCV RBC AUTO: 78.6 FL — LOW (ref 80–100)
MONOCYTES # BLD AUTO: 0.64 K/UL — SIGNIFICANT CHANGE UP (ref 0–0.9)
MONOCYTES NFR BLD AUTO: 5.6 % — SIGNIFICANT CHANGE UP (ref 2–14)
NEUTROPHILS # BLD AUTO: 8.32 K/UL — HIGH (ref 1.8–7.4)
NEUTROPHILS NFR BLD AUTO: 73.3 % — SIGNIFICANT CHANGE UP (ref 43–77)
NITRITE UR-MCNC: NEGATIVE — SIGNIFICANT CHANGE UP
NRBC # FLD: 0 K/UL — SIGNIFICANT CHANGE UP (ref 0–0)
PH UR: 7.5 — SIGNIFICANT CHANGE UP (ref 5–8)
PLATELET # BLD AUTO: 266 K/UL — SIGNIFICANT CHANGE UP (ref 150–400)
PMV BLD: 13.7 FL — HIGH (ref 7–13)
POTASSIUM SERPL-MCNC: 3.7 MMOL/L — SIGNIFICANT CHANGE UP (ref 3.5–5.3)
POTASSIUM SERPL-SCNC: 3.7 MMOL/L — SIGNIFICANT CHANGE UP (ref 3.5–5.3)
PROT SERPL-MCNC: 6.7 G/DL — SIGNIFICANT CHANGE UP (ref 6–8.3)
PROT UR-MCNC: 10 — SIGNIFICANT CHANGE UP
RBC # BLD: 4.39 M/UL — SIGNIFICANT CHANGE UP (ref 3.8–5.2)
RBC # FLD: 15.5 % — HIGH (ref 10.3–14.5)
RBC CASTS # UR COMP ASSIST: HIGH (ref 0–?)
SODIUM SERPL-SCNC: 139 MMOL/L — SIGNIFICANT CHANGE UP (ref 135–145)
SP GR SPEC: 1.01 — SIGNIFICANT CHANGE UP (ref 1–1.04)
SQUAMOUS # UR AUTO: SIGNIFICANT CHANGE UP
UROBILINOGEN FLD QL: NORMAL — SIGNIFICANT CHANGE UP
WBC # BLD: 11.36 K/UL — HIGH (ref 3.8–10.5)
WBC # FLD AUTO: 11.36 K/UL — HIGH (ref 3.8–10.5)
WBC UR QL: >50 — HIGH (ref 0–?)

## 2019-11-03 PROCEDURE — 99285 EMERGENCY DEPT VISIT HI MDM: CPT | Mod: 25

## 2019-11-03 PROCEDURE — 76830 TRANSVAGINAL US NON-OB: CPT | Mod: 26

## 2019-11-03 PROCEDURE — 76775 US EXAM ABDO BACK WALL LIM: CPT | Mod: 26

## 2019-11-03 NOTE — ED ADULT NURSE NOTE - OBJECTIVE STATEMENT
Patient received to intake room 12, A&Ox4, respirations even and unlabored, skin pale for complexion. Patient had vaginal delivery 10/29/2019. Patient complains of sudden onset of left pelvic pain today. Currently denies any pain at time, denies increase in vaginal bleed, denies chest pain, or SOB. Right Ac 20g IV placed, labs drawn and sent.

## 2019-11-03 NOTE — ED PROVIDER NOTE - PATIENT PORTAL LINK FT
You can access the FollowMyHealth Patient Portal offered by [facility] by registering at the following website: [email or web address]. By joining [employer]’s FollowMyHealth portal, you will also be able to view your health information using other applications (apps) compatible with our system.

## 2019-11-03 NOTE — CONSULT NOTE ADULT - ASSESSMENT
P2 s/p  10/29 p/w intermittent LLQ pain. Currently asymptomatic and without complaints. Low concern for gyn emergency as patient is asymptomatic.    - return if severe pain recurs  - plan to f/u with Dr. Marinelli in office this week    OLIVIA Gonzalez PGY2

## 2019-11-03 NOTE — ED PROCEDURE NOTE - PROCEDURE ADDITIONAL DETAILS
[address]08, Ultrasound limited retroperitoneum  Focused ED ultrasund of kidneys and bladder  Indication: LLQ pain  bladder nondistended  bilateral renal ultrasound:  no hydronephrosis.  no visualized cysts  impression: normal focused renal/bladder ultrasound

## 2019-11-03 NOTE — ED PROVIDER NOTE - CLINICAL SUMMARY MEDICAL DECISION MAKING FREE TEXT BOX
29yF w/p vaginal delivery on 10/29 p/w left sided lower abdominal pain, 2 episodes. VSS, non tender abdominal exam. 29yF w/p vaginal delivery on 10/29 p/w left sided lower abdominal pain, 2 episodes. VSS, non tender abdominal exam and pelvic exam 29yF w/p vaginal delivery on 10/29 p/w left sided lower abdominal pain, 2 episodes. VSS, non tender abdominal exam and pelvic exam. Concern for retained products vs ovarian cyst/torsion vs UTI? although no urinary complaints

## 2019-11-03 NOTE — ED PROVIDER NOTE - OBJECTIVE STATEMENT
29yF s/p vaginal delivery on 10/29 p/w left lower abdominal pain. Pt states on Friday 11/01 she felt severe left lower abdominal pain which resolved on its own. This morning she states she had similar pain for approximately one hour with radiation to the left leg. Pt reports the pain has since subsided. Pt denies fever/chills, nausea, vomiting, diarrhea, chest pain, shortness of breath, lightheadedness, dizziness, near syncope or any other concerns.

## 2019-11-03 NOTE — ED ADULT TRIAGE NOTE - CHIEF COMPLAINT QUOTE
Pt states she had normal vaginal delivery on Tuesday. On Friday had left abdominal pain that lasted 15 minutes and then stopped. This morning began having the same pain to left side radiating down left leg that has been constant. Denies pmh. states vaginal bleeding is normal amount after delivery.

## 2019-11-03 NOTE — ED ADULT NURSE NOTE - NSIMPLEMENTINTERV_GEN_ALL_ED
Implemented All Universal Safety Interventions:  [place] to call system. Call bell, personal items and telephone within reach. Instruct patient to call for assistance. Room bathroom lighting operational. Non-slip footwear when patient is off stretcher. Physically safe environment: no spills, clutter or unnecessary equipment. Stretcher in lowest position, wheels locked, appropriate side rails in place.

## 2019-11-03 NOTE — ED PROVIDER NOTE - ATTENDING CONTRIBUTION TO CARE
GEN: no acute respiratory distress. nontoxic, speaking comfortably in full sentences, ambulating with steady gait.  HEENT: NCAT. face symmetrical. PERRL 4mm, EOMI, MMM, oropharynx wnl.  Neck: no JVD, trachea midline, no LAD  CV: RRR. +S1S2, no murmur. 2+ pulses in 4 extremities, cap refill <2 sec  Chest: CTA B/l. no wheezing, rales, rhonchi. no retractions. good air movement.   ABD: +BS, soft, non distended, non tender. No lesions, ecchymosis, surgical scar  : no cva or suprapubic tenderness, uterus small and firm.   MSK: No clubbing, cyanosis, edema. FROM of all extremities.  no proximal thigh swelling. no tenderness to palpation. No midline or paraspinal tenderness.   Neuro: AOOX3. Sensation intact, motor 5/5 throughout.  Skin: no rash    28 yo F  s/p  10/29 with 2 episode of severe llq pain that resolved spontaneously 1st episode friday, then second episode today for 1 hr resolved prior to arrival. no fever, chills, n/v, diarrhea/ constipation, dysuria. patient reports vaginal bleeding slowing and minimal. patient breast feeding. Ddx includes, but not limited to, uti, renal stone, RPOC, muscle spams. low suspicion for surgical pathology. asymptomatic at this time. plan, labs, US, reassess.

## 2019-11-03 NOTE — CONSULT NOTE ADULT - SUBJECTIVE AND OBJECTIVE BOX
FISH CHAMPION  29y  Female [id number]    HPI:        Name of GYN Physician:     POB:      Pgyn: Denies fibroids, cysts, endometriosis, STI's, Abnormal pap smears     Home meds:     Hospital Meds:   MEDICATIONS  (STANDING):    MEDICATIONS  (PRN):      Allergies    No Known Allergies    Intolerances        PAST MEDICAL & SURGICAL HISTORY:  Vaginal delivery:  7-2 f  No significant past surgical history      FAMILY HISTORY:      Social History:  Denies smoking use, drug use, alcohol use.   +occasional social alcohol use    Vital Signs Last 24 Hrs  T(C): 37.3 (2019 15:57), Max: 37.3 (2019 15:57)  T(F): 99.1 (2019 15:57), Max: 99.1 (2019 15:57)  HR: 89 (2019 15:57) (84 - 89)  BP: 122/87 (2019 15:57) (108/65 - 122/87)  BP(mean): --  RR: 18 (2019 15:57) (18 - 18)  SpO2: 100% (2019 15:57) (100% - 100%)    Physical Exam:   General: sitting comftorably in bed, NAD   HEENT: neck supple, full ROM  CV: RR S1S2 no m/r/g  Lungs: CTA b/l, good air flow b/l   Back: No CVA tenderness  Abd: Soft, non-tender, non-distended.  Bowel sounds present.    :  No bleeding on pad.    External labia wnl.  Bimanual exam with no cervical motion tenderness, uterus wnl, adnexa non palpable b/l.  Cervix closed vs. Cervix dilated    cm   Speculum Exam: No active bleeding from os.  Physiologic discharge.    Ext: non-tender b/l, no edema     LABS:                              9.7    11.36 )-----------( 266      ( 2019 13:22 )             34.5     11-03    139  |  108<H>  |  6<L>  ----------------------------<  83  3.7   |  19<L>  |  0.50    Ca    8.6      2019 13:22    TPro  6.7  /  Alb  3.0<L>  /  TBili  0.2  /  DBili  x   /  AST  21  /  ALT  19  /  AlkPhos  83  11-03    I&O's Detail      Urinalysis Basic - ( 2019 13:22 )    Color: LIGHT YELLOW / Appearance: CLEAR / S.009 / pH: 7.5  Gluc: NEGATIVE / Ketone: NEGATIVE  / Bili: NEGATIVE / Urobili: NORMAL   Blood: MODERATE / Protein: 10 / Nitrite: NEGATIVE   Leuk Esterase: LARGE / RBC: 6-10 / WBC >50   Sq Epi: OCC / Non Sq Epi: x / Bacteria: NEGATIVE        RADIOLOGY & ADDITIONAL STUDIES: FISH CHAMPION  29y  Female [id number]    HPI: 28yo         Name of GYN Physician:     POB:      Pgyn: Denies fibroids, cysts, endometriosis, STI's, Abnormal pap smears     Home meds:     Hospital Meds:   MEDICATIONS  (STANDING):    MEDICATIONS  (PRN):      Allergies    No Known Allergies    Intolerances        PAST MEDICAL & SURGICAL HISTORY:  Vaginal delivery:  7-2 f  No significant past surgical history      FAMILY HISTORY:      Social History:  Denies smoking use, drug use, alcohol use.   +occasional social alcohol use    Vital Signs Last 24 Hrs  T(C): 37.3 (2019 15:57), Max: 37.3 (2019 15:57)  T(F): 99.1 (2019 15:57), Max: 99.1 (2019 15:57)  HR: 89 (2019 15:57) (84 - 89)  BP: 122/87 (2019 15:57) (108/65 - 122/87)  BP(mean): --  RR: 18 (2019 15:57) (18 - 18)  SpO2: 100% (2019 15:57) (100% - 100%)    Physical Exam:   General: sitting comftorably in bed, NAD   HEENT: neck supple, full ROM  CV: RR S1S2 no m/r/g  Lungs: CTA b/l, good air flow b/l   Back: No CVA tenderness  Abd: Soft, non-tender, non-distended.  Bowel sounds present.    :  No bleeding on pad.    External labia wnl.  Bimanual exam with no cervical motion tenderness, uterus wnl, adnexa non palpable b/l.  Cervix closed vs. Cervix dilated    cm   Speculum Exam: No active bleeding from os.  Physiologic discharge.    Ext: non-tender b/l, no edema     LABS:                              9.7    11.36 )-----------( 266      ( 2019 13:22 )             34.5     11-03    139  |  108<H>  |  6<L>  ----------------------------<  83  3.7   |  19<L>  |  0.50    Ca    8.6      2019 13:22    TPro  6.7  /  Alb  3.0<L>  /  TBili  0.2  /  DBili  x   /  AST  21  /  ALT  19  /  AlkPhos  83  11-03    I&O's Detail      Urinalysis Basic - ( 2019 13:22 )    Color: LIGHT YELLOW / Appearance: CLEAR / S.009 / pH: 7.5  Gluc: NEGATIVE / Ketone: NEGATIVE  / Bili: NEGATIVE / Urobili: NORMAL   Blood: MODERATE / Protein: 10 / Nitrite: NEGATIVE   Leuk Esterase: LARGE / RBC: 6-10 / WBC >50   Sq Epi: OCC / Non Sq Epi: x / Bacteria: NEGATIVE        RADIOLOGY & ADDITIONAL STUDIES: FISH CHAMPION  29y  Female [id number]    HPI: 28yo P2 s/p  10/29 p/w intermittent abdominal pain for the last few days. Patient reports that yesterday it lasted nearly an hour before resolved. Today, she felt the same pain twice for a few minutes at a time. It resolved spontaneously. Pain is focal in the LLQ and does not radiate. She currently has no pain. Minimal vaginal bleeding. Regular bowel movements. No dysuria or increased frequency. No fevers, chills, N/V/D or any other complaints. +Breastfeeding.        Name of GYN Physician: Isis	    POB:   x2 - 2019, uncomplicated    Pgyn: Denies fibroids, cysts, endometriosis, STI's, Abnormal pap smears     Home meds: tylenol, motrin prn    Allergies  No Known Allergies    PAST MEDICAL & SURGICAL HISTORY:  Vaginal delivery:  7-2 f  No significant past surgical history      FAMILY HISTORY: denies breast and ovarian cancer      Social History:  Denies smoking use, drug use, alcohol use.       Vital Signs Last 24 Hrs  T(C): 37.3 (2019 15:57), Max: 37.3 (2019 15:57)  T(F): 99.1 (2019 15:57), Max: 99.1 (2019 15:57)  HR: 89 (2019 15:57) (84 - 89)  BP: 122/87 (2019 15:57) (108/65 - 122/87)  BP(mean): --  RR: 18 (2019 15:57) (18 - 18)  SpO2: 100% (2019 15:57) (100% - 100%)    Physical Exam:   General: sitting comftorably in bed, NAD   Back: No CVA tenderness  Abd: Soft, non-tender, non-distended.  Bowel sounds present.    :  Scant bleeding on pad.    External labia wnl.  Bimanual exam with no cervical motion tenderness, uterus wnl, adnexa non palpable b/l.  Cervix closed.  Speculum Exam: Lochia wnl.  Ext: non-tender b/l, no edema     LABS:                              9.7    11.36 )-----------( 266      ( 2019 13:22 )             34.5     11-03    139  |  108<H>  |  6<L>  ----------------------------<  83  3.7   |  19<L>  |  0.50    Ca    8.6      2019 13:22    TPro  6.7  /  Alb  3.0<L>  /  TBili  0.2  /  DBili  x   /  AST  21  /  ALT  19  /  AlkPhos  83  11-03    I&O's Detail      Urinalysis Basic - ( 2019 13:22 )    Color: LIGHT YELLOW / Appearance: CLEAR / S.009 / pH: 7.5  Gluc: NEGATIVE / Ketone: NEGATIVE  / Bili: NEGATIVE / Urobili: NORMAL   Blood: MODERATE / Protein: 10 / Nitrite: NEGATIVE   Leuk Esterase: LARGE / RBC: 6-10 / WBC >50   Sq Epi: OCC / Non Sq Epi: x / Bacteria: NEGATIVE        RADIOLOGY & ADDITIONAL STUDIES:    < from: US Transvaginal (19 @ 14:58) >  EXAM:  US TRANSVAGINAL        PROCEDURE DATE:  Nov  3 2019         INTERPRETATION:  CLINICAL INFORMATION: Left-sided pelvic pain status post   vaginal delivery    LMP: Postpartum    COMPARISON: None available.    TECHNIQUE:     Endovaginal and transabdominal pelvic sonogram. Color and Spectral   Doppler was performed. Patient could not tolerate additional transvaginal   imaging to evaluate the left adnexa. Per the ER team, no additional   transvaginal images requested.    FINDINGS:    Uterus: 14.1 x 7.7 x 7.8 cm. Fibroid uterus. The largest measures up to   1.9 cm.    Endometrium: 9 mm. Within normal limits.    Right ovary: 1.8 x 1.4 x 1.4 cm. Within normal limits. Normal arterial   and venous waveforms.    Left ovary: The left ovary was not visualized.    Fluid: None.    IMPRESSION:    Right ovary is normal. The left ovary is not visualized. Nondiagnostic   for left-sided torsion.              FRANCIS STINSON M.D., RADIOLOGY RESIDENT  This document has been electronically signed.  NED REYES M.D., ATTENDING RADIOLOGIST  This document has been electronically signed. Nov  3 2019  3:53PM    < end of copied text >  < from: US Transvaginal (19 @ 14:58) >  EXAM:  US TRANSVAGINAL        PROCEDURE DATE:  Nov  3 2019         INTERPRETATION:  CLINICAL INFORMATION: Left-sided pelvic pain status post   vaginal delivery    LMP: Postpartum    COMPARISON: None available.    TECHNIQUE:     Endovaginal and transabdominal pelvic sonogram. Color and Spectral   Doppler was performed. Patient could not tolerate additional transvaginal   imaging to evaluate the left adnexa. Per the ER team, no additional   transvaginal images requested.    FINDINGS:    Uterus: 14.1 x 7.7 x 7.8 cm. Fibroid uterus. The largest measures up to   1.9 cm.    Endometrium: 9 mm. Within normal limits.    Right ovary: 1.8 x 1.4 x 1.4 cm. Within normal limits. Normal arterial   and venous waveforms.    Left ovary: The left ovary was not visualized.    Fluid: None.    IMPRESSION:    Right ovary is normal. The left ovary is not visualized. Nondiagnostic   for left-sided torsion.              FRANCIS STINSON M.D., RADIOLOGY RESIDENT  This document has been electronically signed.  NED REYES M.D., ATTENDING RADIOLOGIST  This document has been electronically signed. Nov  3 2019  3:53PM    < end of copied text >

## 2019-11-03 NOTE — ED PROVIDER NOTE - NS HIV RISK FACTOR YES
Declined Additional Notes: 1/10 acne of face, doing well on his mother's ONEXTON.  1-2/10 acne of upper trunk, doing fairly well on DIFFERIN OTC and PANOXYL wash.\\n\\nContinue PANOXYL WASH daily to body\\n                 CETAPHIL WASH to face \\n\\nStart FABIOR FOAM ever other day to chest, shoulders and back \\nStart CLINDAMYCIN/BPO GEL to face once daily\\n\\nPatient does not need any oral antibiotics today. Follow up in 2-3 months Detail Level: Detailed

## 2019-11-03 NOTE — ED PROVIDER NOTE - NSFOLLOWUPINSTRUCTIONS_ED_ALL_ED_FT
Follow up with your OBGYN within 2-3 days  Follow up with your primary care provider within 1 week  Return to the ER with any worsening or concerning symptoms, increased pain, fever/chills, nausea, vomiting or any other concerns.

## 2019-11-03 NOTE — ED PROVIDER NOTE - PROGRESS NOTE DETAILS
SOM Jimenez: TVUS unable to visualize left ovary, pt reports two episodes of pain since arriving to ED. Spoke with OBGYN who will see pt in the ED SOM Jimenez: Pt seen by OBMILANN, can be d/c home to follow up with . Pt agrees with this plan

## 2019-11-04 LAB — SPECIMEN SOURCE: SIGNIFICANT CHANGE UP

## 2019-11-05 LAB — BACTERIA UR CULT: SIGNIFICANT CHANGE UP

## 2019-11-06 NOTE — ED POST DISCHARGE NOTE - RESULT SUMMARY
culture grew 3 or more types of organisms  which indicate collection contamination, consider recollection only if clinically indicated. Patient S/P delivery 10/29. No antibiotic listed in ED provider note or prescription writer at time of discharge. Patient told to follow up with OB within 2-3 days. UA; large leuk estrase. Patient contact # [phone]  message left with Call Back  P.A. number and hours for return call back.  [phone] S/W patient who will go to OB for repeat UA/UCX. Discussed with patient need to return to ED if symptoms don't continue to improve or recur or develops any new or worsening symptoms that are of concern. Patient  understands and agrees.

## 2021-02-10 ENCOUNTER — RESULT REVIEW (OUTPATIENT)
Age: 31
End: 2021-02-10

## 2021-02-25 ENCOUNTER — LABORATORY RESULT (OUTPATIENT)
Age: 31
End: 2021-02-25

## 2021-02-25 ENCOUNTER — APPOINTMENT (OUTPATIENT)
Dept: RHEUMATOLOGY | Facility: CLINIC | Age: 31
End: 2021-02-25
Payer: MEDICAID

## 2021-02-25 ENCOUNTER — NON-APPOINTMENT (OUTPATIENT)
Age: 31
End: 2021-02-25

## 2021-02-25 VITALS
TEMPERATURE: 96.5 F | DIASTOLIC BLOOD PRESSURE: 68 MMHG | HEART RATE: 64 BPM | RESPIRATION RATE: 14 BRPM | OXYGEN SATURATION: 98 % | SYSTOLIC BLOOD PRESSURE: 109 MMHG

## 2021-02-25 PROCEDURE — 99072 ADDL SUPL MATRL&STAF TM PHE: CPT

## 2021-02-25 PROCEDURE — 99204 OFFICE O/P NEW MOD 45 MIN: CPT

## 2021-03-09 LAB
ALBUMIN MFR SERPL ELPH: 55.4 %
ALBUMIN SERPL ELPH-MCNC: 4.2 G/DL
ALBUMIN SERPL-MCNC: 4.1 G/DL
ALBUMIN/GLOB SERPL: 1.2 RATIO
ALP BLD-CCNC: 54 U/L
ALPHA1 GLOB MFR SERPL ELPH: 3.7 %
ALPHA1 GLOB SERPL ELPH-MCNC: 0.3 G/DL
ALPHA2 GLOB MFR SERPL ELPH: 9.6 %
ALPHA2 GLOB SERPL ELPH-MCNC: 0.7 G/DL
ALT SERPL-CCNC: 18 U/L
ANA SER IF-ACNC: NEGATIVE
ANION GAP SERPL CALC-SCNC: 13 MMOL/L
APPEARANCE: CLEAR
AST SERPL-CCNC: 22 U/L
B-GLOBULIN MFR SERPL ELPH: 13.2 %
B-GLOBULIN SERPL ELPH-MCNC: 1 G/DL
B2 GLYCOPROT1 AB SER QL: NEGATIVE
BACTERIA: NEGATIVE
BASOPHILS # BLD AUTO: 0.02 K/UL
BASOPHILS NFR BLD AUTO: 0.3 %
BILIRUB SERPL-MCNC: 0.4 MG/DL
BILIRUBIN URINE: NEGATIVE
BLOOD URINE: ABNORMAL
BUN SERPL-MCNC: 10 MG/DL
C3 SERPL-MCNC: 126 MG/DL
C4 SERPL-MCNC: 26 MG/DL
CALCIUM SERPL-MCNC: 9.6 MG/DL
CARDIOLIPIN AB SER IA-ACNC: NEGATIVE
CHLORIDE SERPL-SCNC: 106 MMOL/L
CO2 SERPL-SCNC: 23 MMOL/L
COLOR: NORMAL
CONFIRM: 29.3 SEC
CREAT SERPL-MCNC: 0.75 MG/DL
CREAT SPEC-SCNC: 72 MG/DL
CREAT/PROT UR: 0.2 RATIO
CRP SERPL-MCNC: 0.37 MG/DL
DRVVT IMM 1:2 NP PPP: NORMAL
DRVVT SCREEN TO CONFIRM RATIO: 0.97 RATIO
DSDNA AB SER-ACNC: <12 IU/ML
ENA RNP AB SER IA-ACNC: <0.2 AL
ENA SM AB SER IA-ACNC: <0.2 AL
ENA SS-A AB SER IA-ACNC: 0.2 AL
ENA SS-B AB SER IA-ACNC: <0.2 AL
EOSINOPHIL # BLD AUTO: 0.18 K/UL
EOSINOPHIL NFR BLD AUTO: 2.4 %
ERYTHROCYTE [SEDIMENTATION RATE] IN BLOOD BY WESTERGREN METHOD: 16 MM/HR
GAMMA GLOB FLD ELPH-MCNC: 1.3 G/DL
GAMMA GLOB MFR SERPL ELPH: 18.1 %
GLUCOSE QUALITATIVE U: NEGATIVE
GLUCOSE SERPL-MCNC: 93 MG/DL
HBV CORE IGG+IGM SER QL: NONREACTIVE
HBV SURFACE AG SER QL: NONREACTIVE
HCT VFR BLD CALC: 40.8 %
HCV AB SER QL: NONREACTIVE
HCV S/CO RATIO: 0.17 S/CO
HGB BLD-MCNC: 12.2 G/DL
HYALINE CASTS: 0 /LPF
IMM GRANULOCYTES NFR BLD AUTO: 0.3 %
INTERPRETATION SERPL IEP-IMP: NORMAL
KETONES URINE: NEGATIVE
LEUKOCYTE ESTERASE URINE: ABNORMAL
LYMPHOCYTES # BLD AUTO: 2.15 K/UL
LYMPHOCYTES NFR BLD AUTO: 28.4 %
M PROTEIN SPEC IFE-MCNC: NORMAL
MAN DIFF?: NORMAL
MCHC RBC-ENTMCNC: 23.6 PG
MCHC RBC-ENTMCNC: 29.9 GM/DL
MCV RBC AUTO: 79.1 FL
MICROSCOPIC-UA: NORMAL
MONOCYTES # BLD AUTO: 0.47 K/UL
MONOCYTES NFR BLD AUTO: 6.2 %
MPO AB + PR3 PNL SER: NORMAL
NEUTROPHILS # BLD AUTO: 4.72 K/UL
NEUTROPHILS NFR BLD AUTO: 62.4 %
NITRITE URINE: NEGATIVE
PH URINE: 6.5
PLATELET # BLD AUTO: 271 K/UL
POTASSIUM SERPL-SCNC: 4.3 MMOL/L
PROT SERPL-MCNC: 7.4 G/DL
PROT UR-MCNC: 14 MG/DL
PROTEIN URINE: NORMAL
RBC # BLD: 5.16 M/UL
RBC # FLD: 13.4 %
RED BLOOD CELLS URINE: 10 /HPF
SCREEN DRVVT: 38.1 SEC
SILICA CLOTTING TIME INTERPRETATION: NORMAL
SILICA CLOTTING TIME S/C: 1.1 RATIO
SODIUM SERPL-SCNC: 141 MMOL/L
SPECIFIC GRAVITY URINE: 1.01
SQUAMOUS EPITHELIAL CELLS: 3 /HPF
THYROPEROXIDASE AB SERPL IA-ACNC: <10 IU/ML
UROBILINOGEN URINE: NORMAL
WBC # FLD AUTO: 7.56 K/UL
WHITE BLOOD CELLS URINE: 32 /HPF

## 2021-03-19 ENCOUNTER — APPOINTMENT (OUTPATIENT)
Dept: RHEUMATOLOGY | Facility: CLINIC | Age: 31
End: 2021-03-19
Payer: MEDICAID

## 2021-03-19 VITALS
HEIGHT: 66 IN | SYSTOLIC BLOOD PRESSURE: 119 MMHG | RESPIRATION RATE: 14 BRPM | OXYGEN SATURATION: 98 % | TEMPERATURE: 96 F | HEART RATE: 68 BPM | WEIGHT: 163 LBS | DIASTOLIC BLOOD PRESSURE: 71 MMHG | BODY MASS INDEX: 26.2 KG/M2

## 2021-03-19 DIAGNOSIS — R20.2 ANESTHESIA OF SKIN: ICD-10-CM

## 2021-03-19 DIAGNOSIS — R20.0 ANESTHESIA OF SKIN: ICD-10-CM

## 2021-03-19 PROCEDURE — 99072 ADDL SUPL MATRL&STAF TM PHE: CPT

## 2021-03-19 PROCEDURE — 99213 OFFICE O/P EST LOW 20 MIN: CPT

## 2022-02-14 ENCOUNTER — RESULT REVIEW (OUTPATIENT)
Age: 32
End: 2022-02-14

## 2022-03-08 NOTE — OB RN DELIVERY SUMMARY - BABY A: APGAR 1 MIN COLOR, DELIVERY
Hide Additional Notes?: No
(1) body pink, extremities blue
Detail Level: Zone
Include Location In Plan?: Yes

## 2022-03-14 ENCOUNTER — TRANSCRIPTION ENCOUNTER (OUTPATIENT)
Age: 32
End: 2022-03-14

## 2022-12-06 ENCOUNTER — APPOINTMENT (OUTPATIENT)
Dept: ANTEPARTUM | Facility: CLINIC | Age: 32
End: 2022-12-06

## 2022-12-06 ENCOUNTER — LABORATORY RESULT (OUTPATIENT)
Age: 32
End: 2022-12-06

## 2022-12-06 ENCOUNTER — ASOB RESULT (OUTPATIENT)
Age: 32
End: 2022-12-06

## 2022-12-06 PROCEDURE — 76801 OB US < 14 WKS SINGLE FETUS: CPT

## 2022-12-06 PROCEDURE — 76813 OB US NUCHAL MEAS 1 GEST: CPT | Mod: 59

## 2023-01-31 ENCOUNTER — ASOB RESULT (OUTPATIENT)
Age: 33
End: 2023-01-31

## 2023-01-31 ENCOUNTER — APPOINTMENT (OUTPATIENT)
Dept: ANTEPARTUM | Facility: CLINIC | Age: 33
End: 2023-01-31
Payer: MEDICAID

## 2023-01-31 PROCEDURE — 76811 OB US DETAILED SNGL FETUS: CPT

## 2023-03-10 ENCOUNTER — APPOINTMENT (OUTPATIENT)
Dept: ANTEPARTUM | Facility: CLINIC | Age: 33
End: 2023-03-10
Payer: COMMERCIAL

## 2023-03-10 ENCOUNTER — ASOB RESULT (OUTPATIENT)
Age: 33
End: 2023-03-10

## 2023-03-10 ENCOUNTER — OUTPATIENT (OUTPATIENT)
Dept: INPATIENT UNIT | Facility: HOSPITAL | Age: 33
LOS: 1 days | Discharge: HOME | End: 2023-03-10
Payer: MEDICAID

## 2023-03-10 VITALS — SYSTOLIC BLOOD PRESSURE: 105 MMHG | DIASTOLIC BLOOD PRESSURE: 59 MMHG | HEART RATE: 82 BPM

## 2023-03-10 VITALS
HEART RATE: 90 BPM | DIASTOLIC BLOOD PRESSURE: 62 MMHG | SYSTOLIC BLOOD PRESSURE: 101 MMHG | TEMPERATURE: 98 F | RESPIRATION RATE: 15 BRPM

## 2023-03-10 DIAGNOSIS — O26.899 OTHER SPECIFIED PREGNANCY RELATED CONDITIONS, UNSPECIFIED TRIMESTER: ICD-10-CM

## 2023-03-10 LAB
APPEARANCE UR: CLEAR — SIGNIFICANT CHANGE UP
BACTERIA # UR AUTO: NEGATIVE — SIGNIFICANT CHANGE UP
BASOPHILS # BLD AUTO: 0.02 K/UL — SIGNIFICANT CHANGE UP (ref 0–0.2)
BASOPHILS NFR BLD AUTO: 0.2 % — SIGNIFICANT CHANGE UP (ref 0–2)
BILIRUB UR-MCNC: NEGATIVE — SIGNIFICANT CHANGE UP
COLOR SPEC: YELLOW — SIGNIFICANT CHANGE UP
DIFF PNL FLD: NEGATIVE — SIGNIFICANT CHANGE UP
EOSINOPHIL # BLD AUTO: 0.09 K/UL — SIGNIFICANT CHANGE UP (ref 0–0.5)
EOSINOPHIL NFR BLD AUTO: 1 % — SIGNIFICANT CHANGE UP (ref 0–6)
EPI CELLS # UR: 3 /HPF — SIGNIFICANT CHANGE UP (ref 0–5)
GLUCOSE UR QL: NEGATIVE — SIGNIFICANT CHANGE UP
HCT VFR BLD CALC: 32.7 % — LOW (ref 34.5–45)
HGB BLD-MCNC: 10 G/DL — LOW (ref 11.5–15.5)
HYALINE CASTS # UR AUTO: 2 /LPF — SIGNIFICANT CHANGE UP (ref 0–7)
IANC: 6.54 K/UL — SIGNIFICANT CHANGE UP (ref 1.8–7.4)
IMM GRANULOCYTES NFR BLD AUTO: 1.2 % — HIGH (ref 0–0.9)
KETONES UR-MCNC: NEGATIVE — SIGNIFICANT CHANGE UP
LEUKOCYTE ESTERASE UR-ACNC: NEGATIVE — SIGNIFICANT CHANGE UP
LYMPHOCYTES # BLD AUTO: 1.8 K/UL — SIGNIFICANT CHANGE UP (ref 1–3.3)
LYMPHOCYTES # BLD AUTO: 19.8 % — SIGNIFICANT CHANGE UP (ref 13–44)
MCHC RBC-ENTMCNC: 23.8 PG — LOW (ref 27–34)
MCHC RBC-ENTMCNC: 30.6 GM/DL — LOW (ref 32–36)
MCV RBC AUTO: 77.9 FL — LOW (ref 80–100)
MONOCYTES # BLD AUTO: 0.51 K/UL — SIGNIFICANT CHANGE UP (ref 0–0.9)
MONOCYTES NFR BLD AUTO: 5.6 % — SIGNIFICANT CHANGE UP (ref 2–14)
NEUTROPHILS # BLD AUTO: 6.54 K/UL — SIGNIFICANT CHANGE UP (ref 1.8–7.4)
NEUTROPHILS NFR BLD AUTO: 72.2 % — SIGNIFICANT CHANGE UP (ref 43–77)
NITRITE UR-MCNC: NEGATIVE — SIGNIFICANT CHANGE UP
NRBC # BLD: 0 /100 WBCS — SIGNIFICANT CHANGE UP (ref 0–0)
NRBC # FLD: 0 K/UL — SIGNIFICANT CHANGE UP (ref 0–0)
PH UR: 7.5 — SIGNIFICANT CHANGE UP (ref 5–8)
PLATELET # BLD AUTO: 195 K/UL — SIGNIFICANT CHANGE UP (ref 150–400)
PROT UR-MCNC: ABNORMAL
RBC # BLD: 4.2 M/UL — SIGNIFICANT CHANGE UP (ref 3.8–5.2)
RBC # FLD: 13.4 % — SIGNIFICANT CHANGE UP (ref 10.3–14.5)
RBC CASTS # UR COMP ASSIST: 6 /HPF — HIGH (ref 0–4)
SP GR SPEC: 1.02 — SIGNIFICANT CHANGE UP (ref 1.01–1.05)
UROBILINOGEN FLD QL: SIGNIFICANT CHANGE UP
WBC # BLD: 9.07 K/UL — SIGNIFICANT CHANGE UP (ref 3.8–10.5)
WBC # FLD AUTO: 9.07 K/UL — SIGNIFICANT CHANGE UP (ref 3.8–10.5)
WBC UR QL: 5 /HPF — SIGNIFICANT CHANGE UP (ref 0–5)

## 2023-03-10 PROCEDURE — 76817 TRANSVAGINAL US OBSTETRIC: CPT | Mod: 26

## 2023-03-10 PROCEDURE — 76815 OB US LIMITED FETUS(S): CPT | Mod: 26

## 2023-03-10 PROCEDURE — 99221 1ST HOSP IP/OBS SF/LOW 40: CPT

## 2023-03-10 NOTE — OB PROVIDER TRIAGE NOTE - NSOBPROVIDERNOTE_OBGYN_ALL_OB_FT
32 y/o  @ 25.3 wks gestation presents with c/o left sided abdominal pain x's 2 days :  no evidence of PTL   likely with normal discomforts of pregnancy   plan of care d/w dr sidhu   discharge home  PTL precautions d/w pt  increase fluid intake  instructed on fetal kick counts  follow up with dr rogers as sched  w/v discharge instructions given  discharged home      discharged @ 1107

## 2023-03-10 NOTE — OB PROVIDER TRIAGE NOTE - HISTORY OF PRESENT ILLNESS
34 y/o  @ 25.3 wks gestation presents with c/o left side abdominal discomfort x's 2 days states was seen by Dr Aguilera on 3/9/2023 and states her cervix was "good" and she was to take gas ex and if no relief in 2 hours to go to the hospital for blood work pt having daily bowel movements last BM was on 3/10/2023 @ 0700 , pt tolerating  po fluids and solids  denies any VB or lof reports +FM denies any n/v/d denies any fever or chills ap care  comp by :   anemia ( thalassemia ) 
no

## 2023-03-10 NOTE — OB PROVIDER TRIAGE NOTE - NSHPPHYSICALEXAM_GEN_ALL_CORE
abdomen: soft, nt on palp  SSE: cervix appears closed and posterior  no LOF no VB  TVS: 4.53 cm  TAS: breech posterior MVP: 6.69 FH- 148 bpm   sono report in ASOB  sono images in ASOB   T(C): 36.7 (03-10-23 @ 09:48), Max: 36.7 (03-10-23 @ 09:31)  HR: 86 (03-10-23 @ 10:30) (86 - 92)  BP: 105/59 (03-10-23 @ 10:30) (101/62 - 105/59)  RR: 15 (03-10-23 @ 09:31) (15 - 15)  SpO2: --  cat 1 FHT  toco: no uterine contractions noted abdomen: soft, nt on palp  no guarding no rebound tenderness   SSE: cervix appears closed and posterior  no LOF no VB  TVS: 4.53 cm  TAS: breech posterior MVP: 6.69 FH- 148 bpm   sono report in ASOB  sono images in ASOB   T(C): 36.7 (03-10-23 @ 09:48), Max: 36.7 (03-10-23 @ 09:31)  HR: 86 (03-10-23 @ 10:30) (86 - 92)  BP: 105/59 (03-10-23 @ 10:30) (101/62 - 105/59)  RR: 15 (03-10-23 @ 09:31) (15 - 15)  SpO2: --  cat 1 FHT  toco: no uterine contractions noted

## 2023-03-10 NOTE — OB RN TRIAGE NOTE - CHIEF COMPLAINT QUOTE
l side abd pain- I went to  yesterday and I was told to take gas ex-i have no relief so told to come get blood work

## 2023-03-10 NOTE — OB PROVIDER TRIAGE NOTE - NSHPLABSRESULTS_GEN_ALL_CORE
CBC  CBC Full  -  ( 10 Mar 2023 09:55 )  WBC Count : 9.07 K/uL  RBC Count : 4.20 M/uL  Hemoglobin : 10.0 g/dL  Hematocrit : 32.7 %  Platelet Count - Automated : 195 K/uL  Mean Cell Volume : 77.9 fL  Mean Cell Hemoglobin : 23.8 pg  Mean Cell Hemoglobin Concentration : 30.6 gm/dL  Auto Neutrophil # : 6.54 K/uL  Auto Lymphocyte # : 1.80 K/uL  Auto Monocyte # : 0.51 K/uL  Auto Eosinophil # : 0.09 K/uL  Auto Basophil # : 0.02 K/uL  Auto Neutrophil % : 72.2 %  Auto Lymphocyte % : 19.8 %  Auto Monocyte % : 5.6 %  Auto Eosinophil % : 1.0 %  Auto Basophil % : 0.2 %    Urinalysis Basic - ( 10 Mar 2023 09:56 )    Color: Yellow / Appearance: Clear / S.024 / pH: x  Gluc: x / Ketone: Negative  / Bili: Negative / Urobili: <2 mg/dL   Blood: x / Protein: 30 mg/dL / Nitrite: Negative   Leuk Esterase: Negative / RBC: 6 /HPF / WBC 5 /HPF   Sq Epi: x / Non Sq Epi: 3 /HPF / Bacteria: Negative

## 2023-03-10 NOTE — OB PROVIDER TRIAGE NOTE - ADDITIONAL INSTRUCTIONS
34 y/o  @ 25.3 wks gestation presents with c/o left sided abdominal pain x's 2 days :  no evidence of PTL   likely with normal discomforts of pregnancy   plan of care d/w dr sidhu   discharge home  PTL precautions d/w pt  increase fluid intake  instructed on fetal kick counts  follow up with dr rogers as sched  w/v discharge instructions given  discharged home      discharged @ 110

## 2023-03-13 DIAGNOSIS — O99.012 ANEMIA COMPLICATING PREGNANCY, SECOND TRIMESTER: ICD-10-CM

## 2023-03-13 DIAGNOSIS — O26.892 OTHER SPECIFIED PREGNANCY RELATED CONDITIONS, SECOND TRIMESTER: ICD-10-CM

## 2023-03-13 DIAGNOSIS — D56.9 THALASSEMIA, UNSPECIFIED: ICD-10-CM

## 2023-03-13 DIAGNOSIS — Z3A.25 25 WEEKS GESTATION OF PREGNANCY: ICD-10-CM

## 2023-03-13 DIAGNOSIS — R10.32 LEFT LOWER QUADRANT PAIN: ICD-10-CM

## 2023-06-16 ENCOUNTER — INPATIENT (INPATIENT)
Facility: HOSPITAL | Age: 33
LOS: 1 days | Discharge: HOME | End: 2023-06-18
Attending: SPECIALIST | Admitting: SPECIALIST

## 2023-06-16 ENCOUNTER — TRANSCRIPTION ENCOUNTER (OUTPATIENT)
Age: 33
End: 2023-06-16

## 2023-06-16 VITALS — RESPIRATION RATE: 16 BRPM | TEMPERATURE: 98 F

## 2023-06-16 DIAGNOSIS — O26.899 OTHER SPECIFIED PREGNANCY RELATED CONDITIONS, UNSPECIFIED TRIMESTER: ICD-10-CM

## 2023-06-16 PROBLEM — D64.9 ANEMIA, UNSPECIFIED: Chronic | Status: ACTIVE | Noted: 2023-03-10

## 2023-06-16 LAB
BASOPHILS # BLD AUTO: 0.02 K/UL — SIGNIFICANT CHANGE UP (ref 0–0.2)
BASOPHILS NFR BLD AUTO: 0.2 % — SIGNIFICANT CHANGE UP (ref 0–2)
BLD GP AB SCN SERPL QL: NEGATIVE — SIGNIFICANT CHANGE UP
EOSINOPHIL # BLD AUTO: 0.03 K/UL — SIGNIFICANT CHANGE UP (ref 0–0.5)
EOSINOPHIL NFR BLD AUTO: 0.2 % — SIGNIFICANT CHANGE UP (ref 0–6)
HCT VFR BLD CALC: 31.7 % — LOW (ref 34.5–45)
HGB BLD-MCNC: 9.7 G/DL — LOW (ref 11.5–15.5)
IANC: 9.3 K/UL — HIGH (ref 1.8–7.4)
IMM GRANULOCYTES NFR BLD AUTO: 0.5 % — SIGNIFICANT CHANGE UP (ref 0–0.9)
LYMPHOCYTES # BLD AUTO: 1.86 K/UL — SIGNIFICANT CHANGE UP (ref 1–3.3)
LYMPHOCYTES # BLD AUTO: 15.3 % — SIGNIFICANT CHANGE UP (ref 13–44)
MCHC RBC-ENTMCNC: 22.9 PG — LOW (ref 27–34)
MCHC RBC-ENTMCNC: 30.6 GM/DL — LOW (ref 32–36)
MCV RBC AUTO: 74.8 FL — LOW (ref 80–100)
MONOCYTES # BLD AUTO: 0.86 K/UL — SIGNIFICANT CHANGE UP (ref 0–0.9)
MONOCYTES NFR BLD AUTO: 7.1 % — SIGNIFICANT CHANGE UP (ref 2–14)
NEUTROPHILS # BLD AUTO: 9.3 K/UL — HIGH (ref 1.8–7.4)
NEUTROPHILS NFR BLD AUTO: 76.7 % — SIGNIFICANT CHANGE UP (ref 43–77)
NRBC # BLD: 0 /100 WBCS — SIGNIFICANT CHANGE UP (ref 0–0)
NRBC # FLD: 0 K/UL — SIGNIFICANT CHANGE UP (ref 0–0)
PLATELET # BLD AUTO: 248 K/UL — SIGNIFICANT CHANGE UP (ref 150–400)
RBC # BLD: 4.24 M/UL — SIGNIFICANT CHANGE UP (ref 3.8–5.2)
RBC # FLD: 14.7 % — HIGH (ref 10.3–14.5)
RH IG SCN BLD-IMP: POSITIVE — SIGNIFICANT CHANGE UP
WBC # BLD: 12.13 K/UL — HIGH (ref 3.8–10.5)
WBC # FLD AUTO: 12.13 K/UL — HIGH (ref 3.8–10.5)

## 2023-06-16 RX ORDER — SODIUM CHLORIDE 9 MG/ML
1000 INJECTION, SOLUTION INTRAVENOUS ONCE
Refills: 0 | Status: DISCONTINUED | OUTPATIENT
Start: 2023-06-16 | End: 2023-06-16

## 2023-06-16 RX ORDER — SODIUM CHLORIDE 9 MG/ML
1000 INJECTION, SOLUTION INTRAVENOUS
Refills: 0 | Status: DISCONTINUED | OUTPATIENT
Start: 2023-06-16 | End: 2023-06-16

## 2023-06-16 RX ORDER — SIMETHICONE 80 MG/1
80 TABLET, CHEWABLE ORAL EVERY 4 HOURS
Refills: 0 | Status: DISCONTINUED | OUTPATIENT
Start: 2023-06-16 | End: 2023-06-18

## 2023-06-16 RX ORDER — LANOLIN
1 OINTMENT (GRAM) TOPICAL EVERY 6 HOURS
Refills: 0 | Status: DISCONTINUED | OUTPATIENT
Start: 2023-06-16 | End: 2023-06-18

## 2023-06-16 RX ORDER — OXYTOCIN 10 UNIT/ML
VIAL (ML) INJECTION
Qty: 30 | Refills: 0 | Status: DISCONTINUED | OUTPATIENT
Start: 2023-06-16 | End: 2023-06-16

## 2023-06-16 RX ORDER — OXYTOCIN 10 UNIT/ML
333.33 VIAL (ML) INJECTION
Qty: 20 | Refills: 0 | Status: DISCONTINUED | OUTPATIENT
Start: 2023-06-16 | End: 2023-06-16

## 2023-06-16 RX ORDER — IBUPROFEN 200 MG
600 TABLET ORAL EVERY 6 HOURS
Refills: 0 | Status: COMPLETED | OUTPATIENT
Start: 2023-06-16 | End: 2024-05-14

## 2023-06-16 RX ORDER — HYDROCORTISONE 1 %
1 OINTMENT (GRAM) TOPICAL EVERY 6 HOURS
Refills: 0 | Status: DISCONTINUED | OUTPATIENT
Start: 2023-06-16 | End: 2023-06-18

## 2023-06-16 RX ORDER — OXYTOCIN 10 UNIT/ML
41.67 VIAL (ML) INJECTION
Qty: 20 | Refills: 0 | Status: DISCONTINUED | OUTPATIENT
Start: 2023-06-16 | End: 2023-06-18

## 2023-06-16 RX ORDER — DIBUCAINE 1 %
1 OINTMENT (GRAM) RECTAL EVERY 6 HOURS
Refills: 0 | Status: DISCONTINUED | OUTPATIENT
Start: 2023-06-16 | End: 2023-06-18

## 2023-06-16 RX ORDER — CITRIC ACID/SODIUM CITRATE 300-500 MG
15 SOLUTION, ORAL ORAL EVERY 6 HOURS
Refills: 0 | Status: DISCONTINUED | OUTPATIENT
Start: 2023-06-16 | End: 2023-06-16

## 2023-06-16 RX ORDER — OXYCODONE HYDROCHLORIDE 5 MG/1
5 TABLET ORAL ONCE
Refills: 0 | Status: DISCONTINUED | OUTPATIENT
Start: 2023-06-16 | End: 2023-06-18

## 2023-06-16 RX ORDER — BENZOCAINE 10 %
1 GEL (GRAM) MUCOUS MEMBRANE EVERY 6 HOURS
Refills: 0 | Status: DISCONTINUED | OUTPATIENT
Start: 2023-06-16 | End: 2023-06-18

## 2023-06-16 RX ORDER — MAGNESIUM HYDROXIDE 400 MG/1
30 TABLET, CHEWABLE ORAL
Refills: 0 | Status: DISCONTINUED | OUTPATIENT
Start: 2023-06-16 | End: 2023-06-18

## 2023-06-16 RX ORDER — TETANUS TOXOID, REDUCED DIPHTHERIA TOXOID AND ACELLULAR PERTUSSIS VACCINE, ADSORBED 5; 2.5; 8; 8; 2.5 [IU]/.5ML; [IU]/.5ML; UG/.5ML; UG/.5ML; UG/.5ML
0.5 SUSPENSION INTRAMUSCULAR ONCE
Refills: 0 | Status: DISCONTINUED | OUTPATIENT
Start: 2023-06-16 | End: 2023-06-18

## 2023-06-16 RX ORDER — PRAMOXINE HYDROCHLORIDE 150 MG/15G
1 AEROSOL, FOAM RECTAL EVERY 4 HOURS
Refills: 0 | Status: DISCONTINUED | OUTPATIENT
Start: 2023-06-16 | End: 2023-06-18

## 2023-06-16 RX ORDER — AER TRAVELER 0.5 G/1
1 SOLUTION RECTAL; TOPICAL EVERY 4 HOURS
Refills: 0 | Status: DISCONTINUED | OUTPATIENT
Start: 2023-06-16 | End: 2023-06-18

## 2023-06-16 RX ORDER — KETOROLAC TROMETHAMINE 30 MG/ML
30 SYRINGE (ML) INJECTION ONCE
Refills: 0 | Status: DISCONTINUED | OUTPATIENT
Start: 2023-06-16 | End: 2023-06-18

## 2023-06-16 RX ORDER — OXYCODONE HYDROCHLORIDE 5 MG/1
5 TABLET ORAL
Refills: 0 | Status: DISCONTINUED | OUTPATIENT
Start: 2023-06-16 | End: 2023-06-18

## 2023-06-16 RX ORDER — SODIUM CHLORIDE 9 MG/ML
3 INJECTION INTRAMUSCULAR; INTRAVENOUS; SUBCUTANEOUS EVERY 8 HOURS
Refills: 0 | Status: DISCONTINUED | OUTPATIENT
Start: 2023-06-16 | End: 2023-06-18

## 2023-06-16 RX ORDER — ACETAMINOPHEN 500 MG
975 TABLET ORAL
Refills: 0 | Status: DISCONTINUED | OUTPATIENT
Start: 2023-06-16 | End: 2023-06-18

## 2023-06-16 RX ORDER — DIPHENHYDRAMINE HCL 50 MG
25 CAPSULE ORAL EVERY 6 HOURS
Refills: 0 | Status: DISCONTINUED | OUTPATIENT
Start: 2023-06-16 | End: 2023-06-18

## 2023-06-16 RX ORDER — CHLORHEXIDINE GLUCONATE 213 G/1000ML
1 SOLUTION TOPICAL DAILY
Refills: 0 | Status: DISCONTINUED | OUTPATIENT
Start: 2023-06-16 | End: 2023-06-16

## 2023-06-16 RX ADMIN — CHLORHEXIDINE GLUCONATE 1 APPLICATION(S): 213 SOLUTION TOPICAL at 15:30

## 2023-06-16 RX ADMIN — SODIUM CHLORIDE 125 MILLILITER(S): 9 INJECTION, SOLUTION INTRAVENOUS at 15:00

## 2023-06-16 RX ADMIN — Medication 975 MILLIGRAM(S): at 23:05

## 2023-06-16 RX ADMIN — Medication 975 MILLIGRAM(S): at 22:34

## 2023-06-16 NOTE — OB RN PATIENT PROFILE - FUNCTIONAL SCREEN CURRENT LEVEL: COMMUNICATION, MLM
Orders received from Dr. Campos to schedule patient for surgery.  Patient would like 2020, but is not ready to schedule at this time.   0 = understands/communicates without difficulty

## 2023-06-16 NOTE — DISCHARGE NOTE OB - PATIENT PORTAL LINK FT
You can access the FollowMyHealth Patient Portal offered by [facility] by registering at the following website: [email or web address]. By joining [employer]’s FollowMyHealth portal, you will also be able to view your health information using other applications (apps) compatible with our system.

## 2023-06-16 NOTE — OB PROVIDER H&P - HISTORY OF PRESENT ILLNESS
32yo  @ 39.3 presents with c/o irregular ctx. Denies LOF, VB and reports GFM.   AP course uncomplicated. VE on  3cm.     Denies PMH/PSH

## 2023-06-16 NOTE — OB PROVIDER TRIAGE NOTE - HISTORY OF PRESENT ILLNESS
34yo  @ 39.3 presents with c/o irregular ctx. Denies LOF, VB and reports GFM.   AP course uncomplicated. VE on  3cm.     Denies PMH/PSH

## 2023-06-16 NOTE — OB PROVIDER H&P - ASSESSMENT
Admit for labor  Epidural for pain management  Oxytocin for augmentation of labor after epidural  Routine admission orders  D/W Dr. Gallo

## 2023-06-16 NOTE — DISCHARGE NOTE OB - MEDICATION SUMMARY - MEDICATIONS TO CHANGE
Anxiety    HTN (hypertension)    Mild intermittent asthma, unspecified whether complicated    PTSD (post-traumatic stress disorder) I will SWITCH the dose or number of times a day I take the medications listed below when I get home from the hospital:  None

## 2023-06-16 NOTE — DISCHARGE NOTE OB - NS MD DC FALL RISK RISK
For information on Fall & Injury Prevention, visit: [email or web address] OR  [email or web address] OR  https://www.cdc.gov/steadi/patient.html

## 2023-06-16 NOTE — OB RN DELIVERY SUMMARY - NSSELHIDDEN_OBGYN_ALL_OB_FT
[NS_DeliveryAttending1_OBGYN_ALL_OB_FT:[email or web address]=],[NS_DeliveryAttending2_OBGYN_ALL_OB_FT:[email or web address]==]

## 2023-06-16 NOTE — DISCHARGE NOTE OB - MATERIALS PROVIDED
Vaccinations/[facility] [place] Screening Program/[place]  Immunization Record/Breastfeeding Log/Bottle Feeding Log/Breastfeeding Mother’s Support Group Information/Guide to Postpartum Care/[facility] Hearing Screen Program/Back To Sleep Handout/Shaken Baby Prevention Handout/Breastfeeding Guide and Packet/Birth Certificate Instructions/Discharge Medication Information for Patients and Families Pocket Guide/MMR Vaccination (VIS Pub Date: 2012)/Tdap Vaccination (VIS Pub Date: 2012) Vaccinations/[facility]  Screening Program/  Immunization Record/Breastfeeding Log/Bottle Feeding Log/Guide to Postpartum Care/[facility] Hearing Screen Program/Back To Sleep Handout/Shaken Baby Prevention Handout/Birth Certificate Instructions/MMR Vaccination (VIS Pub Date: 2012)

## 2023-06-16 NOTE — OB PROVIDER DELIVERY SUMMARY - NSSELHIDDEN_OBGYN_ALL_OB_FT
[NS_DeliveryAttending1_OBGYN_ALL_OB_FT:[email or web address]=],[NS_DeliveryAttending2_OBGYN_ALL_OB_FT:[id number]==]

## 2023-06-16 NOTE — OB RN PATIENT PROFILE - NS_CONSENTSAPP_OBGYN_ALL_OB
Anesthetic History   No history of anesthetic complications            Review of Systems / Medical History  Pertinent labs reviewed    Pulmonary  Within defined limits                 Neuro/Psych         Psychiatric history     Cardiovascular    Hypertension  Valvular problems/murmurs (Mod AI): aortic insufficiency      Dysrhythmias (paroxysmal A Fib) : atrial fibrillation  PAD    Exercise tolerance: <4 METS: At about 4 METs  Comments: Bifascicular block   GI/Hepatic/Renal     GERD: well controlled           Endo/Other      Hypothyroidism  Arthritis     Other Findings            Physical Exam    Airway  Mallampati: II  TM Distance: 4 - 6 cm  Neck ROM: normal range of motion   Mouth opening: Normal     Cardiovascular  Regular rate and rhythm,  S1 and S2 normal,  no murmur, click, rub, or gallop             Dental  No notable dental hx       Pulmonary  Breath sounds clear to auscultation               Abdominal  GI exam deferred       Other Findings            Anesthetic Plan    ASA: 3  Anesthesia type: total IV anesthesia          Induction: Intravenous  Anesthetic plan and risks discussed with: Patient N/A

## 2023-06-16 NOTE — OB RN PATIENT PROFILE - AS SC BRADEN MOBILITY
I spoke with Heaven luo 
Left message for Ricki Valentine to call the office  Please send call back to me 
TCM scheduled for 05- Dr Jad Cavazos 10:30am  Admitted to [facility] 04-11 then admitted to [facility] 04- and will be discharge 05-
Will call upon discharge 
(4) no limitation

## 2023-06-16 NOTE — OB PROVIDER H&P - NSHPPHYSICALEXAM_GEN_ALL_CORE
Assessment reveals VSS, abdomen soft, NT, gravid.  VE 5/70/-3  Cat 1 FHT, ctx occasional on toco  Limited US- vtx, MVP 7.9, posterior placenta  EFW 8-0 by leopold Desires epidural

## 2023-06-16 NOTE — OB RN TRIAGE NOTE - FALL HARM RISK - UNIVERSAL INTERVENTIONS
Bed in lowest position, wheels locked, appropriate side rails in place/Call bell, personal items and telephone in reach/Instruct patient to call for assistance before getting out of bed or chair/Non-slip footwear when patient is out of bed/[place] to call system/Physically safe environment - no spills, clutter or unnecessary equipment/Purposeful Proactive Rounding/Room/bathroom lighting operational, light cord in reach

## 2023-06-16 NOTE — DISCHARGE NOTE OB - MEDICATION SUMMARY - MEDICATIONS TO TAKE
PROVIDER:[TOKEN:[[id number]]] I will START or STAY ON the medications listed below when I get home from the hospital:  None

## 2023-06-16 NOTE — OB RN PATIENT PROFILE - FALL HARM RISK - UNIVERSAL INTERVENTIONS
room air
Bed in lowest position, wheels locked, appropriate side rails in place/Call bell, personal items and telephone in reach/Instruct patient to call for assistance before getting out of bed or chair/Non-slip footwear when patient is out of bed/[place] to call system/Physically safe environment - no spills, clutter or unnecessary equipment/Purposeful Proactive Rounding/Room/bathroom lighting operational, light cord in reach

## 2023-06-16 NOTE — OB RN PATIENT PROFILE - NS_RELATIONSHIPOFSUPPORTPERSON_OBGYN_ALL_OB_FT
21        RE: Ashli Carbone     : 1970    Dear Dr. Mame Diaz,    This letter is to inform you that your patient is being scheduled for surgery with Dr. Princess Temple on 11/3/21 at [facility]. We have asked the patient to contact your office to [phone]

## 2023-06-16 NOTE — OB RN DELIVERY SUMMARY - NS_SEPSISRSKCALC_OBGYN_ALL_OB_FT
EOS calculated successfully. EOS Risk Factor: 0.5/1000 live births ([facility] national incidence); GA=39w3d; Temp=98.24; ROM=1.183; GBS='Negative'; Antibiotics='No antibiotics or any antibiotics < 2 hrs prior to birth'

## 2023-06-16 NOTE — OB PROVIDER H&P - NSLOWPPHRISK_OBGYN_A_OB
No previous uterine incision/[place] Pregnancy/Less than or equal to 4 previous vaginal births/No known bleeding disorder/No history of postpartum hemorrhage/No other PPH risks indicated

## 2023-06-16 NOTE — OB PROVIDER TRIAGE NOTE - NS_OBGYNHISTORY_OBGYN_ALL_OB_FT
10/22/2017- FT  7-2  10/29/2019- FT  7-4    AP course uncomplicated 10/22/2017- FT  7-2  10/29/2019- FT  7-4    AP course uncomplicated  GBS negative

## 2023-06-16 NOTE — DISCHARGE NOTE OB - CARE PROVIDER_API CALL
Naman Marinelli.  Obstetrics and Gynecology  [address] [place] [address]  Phone: [phone]  Fax: [phone]  Follow Up Time:

## 2023-06-16 NOTE — OB PROVIDER DELIVERY SUMMARY - NSPROVIDERDELIVERYNOTE_OBGYN_ALL_OB_FT
Patient fully dilated and pushing.  of viable female infant in OA position.  Head, shoulders and body delivered easily.  nuchal cord x1  noted.  Cord clamped and cut after 1 minute.  Nose and mouth bulb suctioned.  Infant handed to awaiting mother.  Placenta delivered spontaneously intact.  2nd degree laceration repaired in the usual fashion with 2-0 chromic. Good hemostasis noted.  Lap counts correct.  Mother and baby in stable condition.

## 2023-06-16 NOTE — OB PROVIDER TRIAGE NOTE - NSHPPHYSICALEXAM_GEN_ALL_CORE
Assessment reveals VSS, abdomen soft, NT, gravid.  VE 5/70/-3 Assessment reveals VSS, abdomen soft, NT, gravid.  VE 5/70/-3  Cat 1 FHT, ctx occasional on toco  TAS- Assessment reveals VSS, abdomen soft, NT, gravid.  VE 5/70/-3  Cat 1 FHT, ctx occasional on toco  Limited US- vtx, MVP 7.9, posterior placenta  EFW 8-0 by leopold Desires epidural

## 2023-06-17 LAB
COVID-19 SPIKE DOMAIN AB INTERP: POSITIVE
COVID-19 SPIKE DOMAIN ANTIBODY RESULT: >250 U/ML — HIGH
SARS-COV-2 IGG+IGM SERPL QL IA: >250 U/ML — HIGH
SARS-COV-2 IGG+IGM SERPL QL IA: POSITIVE
T PALLIDUM AB TITR SER: NEGATIVE — SIGNIFICANT CHANGE UP

## 2023-06-17 RX ORDER — IBUPROFEN 200 MG
600 TABLET ORAL EVERY 6 HOURS
Refills: 0 | Status: DISCONTINUED | OUTPATIENT
Start: 2023-06-17 | End: 2023-06-18

## 2023-06-17 RX ADMIN — Medication 600 MILLIGRAM(S): at 06:17

## 2023-06-17 RX ADMIN — Medication 975 MILLIGRAM(S): at 20:42

## 2023-06-17 RX ADMIN — Medication 975 MILLIGRAM(S): at 16:05

## 2023-06-17 RX ADMIN — Medication 975 MILLIGRAM(S): at 21:30

## 2023-06-17 RX ADMIN — Medication 600 MILLIGRAM(S): at 05:47

## 2023-06-17 RX ADMIN — SODIUM CHLORIDE 3 MILLILITER(S): 9 INJECTION INTRAMUSCULAR; INTRAVENOUS; SUBCUTANEOUS at 13:00

## 2023-06-17 RX ADMIN — Medication 600 MILLIGRAM(S): at 12:15

## 2023-06-17 RX ADMIN — SODIUM CHLORIDE 3 MILLILITER(S): 9 INJECTION INTRAMUSCULAR; INTRAVENOUS; SUBCUTANEOUS at 06:20

## 2023-06-17 RX ADMIN — Medication 600 MILLIGRAM(S): at 17:33

## 2023-06-17 RX ADMIN — Medication 1 TABLET(S): at 11:39

## 2023-06-17 RX ADMIN — Medication 975 MILLIGRAM(S): at 15:39

## 2023-06-17 RX ADMIN — Medication 975 MILLIGRAM(S): at 08:30

## 2023-06-17 RX ADMIN — Medication 600 MILLIGRAM(S): at 18:05

## 2023-06-17 RX ADMIN — Medication 600 MILLIGRAM(S): at 11:39

## 2023-06-17 RX ADMIN — Medication 975 MILLIGRAM(S): at 07:55

## 2023-06-17 NOTE — PROGRESS NOTE ADULT - SUBJECTIVE AND OBJECTIVE BOX
S: Patient doing well. Minimal lochia. Pain controlled.  Post Partum day : 1  O: Vital Signs Last 24 Hrs  T(C): 36.7 (2023 10:00), Max: 36.8 (2023 17:00)  T(F): 98.1 (2023 10:00), Max: 98.24 (2023 17:00)  HR: 81 (2023 05:56) (72 - 119)  BP: 100/60 (2023 05:56) (97/63 - 129/75)  BP(mean): --  RR: 19 (2023 10:00) (16 - 19)  SpO2: 99% (2023 10:00) (86% - 100%)    Parameters below as of 2023 10:00  Patient On (Oxygen Delivery Method): room air        Gen: No acute distress  Abd: soft, NT,  fundus firm below umbilicus  Lochia:Normal  Ext: no tenderness    Labs:                        9.7    12.13 )-----------( 248      ( 2023 15:00 )             31.7       A: 33y PPD # 1 s/p  doing well.    Plan: Discharge home

## 2023-06-18 VITALS
DIASTOLIC BLOOD PRESSURE: 65 MMHG | SYSTOLIC BLOOD PRESSURE: 133 MMHG | OXYGEN SATURATION: 100 % | TEMPERATURE: 99 F | RESPIRATION RATE: 19 BRPM | HEART RATE: 82 BPM

## 2023-06-18 RX ADMIN — Medication 600 MILLIGRAM(S): at 11:44

## 2023-06-18 RX ADMIN — Medication 600 MILLIGRAM(S): at 01:25

## 2023-06-18 RX ADMIN — Medication 1 TABLET(S): at 11:44

## 2023-06-18 RX ADMIN — Medication 600 MILLIGRAM(S): at 12:40

## 2023-06-18 RX ADMIN — Medication 975 MILLIGRAM(S): at 02:50

## 2023-06-18 RX ADMIN — Medication 600 MILLIGRAM(S): at 06:58

## 2023-06-18 RX ADMIN — Medication 975 MILLIGRAM(S): at 08:53

## 2023-06-18 RX ADMIN — Medication 975 MILLIGRAM(S): at 09:50

## 2023-06-18 RX ADMIN — Medication 975 MILLIGRAM(S): at 02:09

## 2023-06-18 RX ADMIN — Medication 600 MILLIGRAM(S): at 00:36

## 2023-06-18 RX ADMIN — Medication 600 MILLIGRAM(S): at 07:50

## 2023-09-30 NOTE — PATIENT PROFILE OB - ALCOHOL USE HISTORY SINGLE SELECT
[address] [facility]  Progress Note  Name: Katerina Casiano  MRN: [id number]  Unit/Bed#: S -74 I Date of Admission: 9/29/2023   Date of Service: 9/30/2023 I Hospital Day: 1    Assessment/Plan   * Bronchitis  Assessment & Plan  · Presents complaining of cough congestion shortness of breath since 2 days. · CT chest shows multifocal groundglass opacities, new from the prior study and less severe than on the earlier CT from 4/3/2021. This appearance may represent viral pneumonia, particularly COVID-19, though testing was negative. Appearance is otherwise nonspecific though in keeping with an infectious/inflammatory etiology. Plan:  · Oral cefdinir 300 mg every 12 hours  · DuoNeb every 6 hours. · F/u blood cultures. · Tylenol as needed for pain and fever. · Tobacco cessation recommended. · Nicotine patch offered. Tobacco use  Assessment & Plan  · Current smoker, 7 cigarettes/day. · Nicotine patch offered. Depression with anxiety  Assessment & Plan  · Continue home dose fluoxetine and buspirone. Asthma  Assessment & Plan  · Currently taking fluticasone inhaler daily and albuterol rescue inhaler. Also takes Singulair at night. VTE Pharmacologic Prophylaxis: VTE Score: 4 Moderate Risk (Score 3-4) - Pharmacological DVT Prophylaxis Ordered: enoxaparin (Lovenox). Patient Centered Rounds: I performed bedside rounds with nursing staff today. Discussions with Specialists or Other Care Team Provider: None    Education and Discussions with Family / Patient: Patient. Current Length of Stay: 1 day(s)  Current Patient Status: Inpatient   Discharge Plan: Anticipate discharge in 24-48 hrs to home. Code Status: Level 1 - Full Code    Subjective:   Patient examined at bedside. No overnight events reported. She mentions improvement in her cough and sputum. She also reports no feverish feeling overnight.   But she still feels little short of breath whenever she tries to ambulate. She denies any headaches, dizziness, chest pain, palpitations, pain abdomen, pain elsewhere in the body. She also declines any changes in bladder or bowel status. Objective:     Vitals:   Temp (24hrs), Av.1 °F (36.7 °C), Min:97.5 °F (36.4 °C), Max:98.7 °F (37.1 °C)    Temp:  [97.5 °F (36.4 °C)-98.7 °F (37.1 °C)] 97.5 °F (36.4 °C)  HR:  [] 82  Resp:  [16-22] 18  BP: (113-129)/(58-77) 114/63  SpO2:  [89 %-98 %] 96 %  Body mass index is 29.35 kg/m². Input and Output Summary (last 24 hours): Intake/Output Summary (Last 24 hours) at 2023 1202  Last data filed at 2023 0855  Gross per 24 hour   Intake 230 ml   Output --   Net 230 ml       Physical Exam:   Physical Exam  Vitals and nursing note reviewed. Constitutional:       General: She is not in acute distress. Appearance: She is well-developed. HENT:      Head: Normocephalic and atraumatic. Eyes:      Conjunctiva/sclera: Conjunctivae normal.   Cardiovascular:      Rate and Rhythm: Normal rate and regular rhythm. Heart sounds: No murmur heard. Pulmonary:      Effort: Pulmonary effort is normal. No respiratory distress. Breath sounds: Normal breath sounds. No wheezing, rhonchi or rales. Abdominal:      Palpations: Abdomen is soft. Tenderness: There is no abdominal tenderness. Musculoskeletal:         General: No swelling. Cervical back: Neck supple. Right lower leg: No edema. Left lower leg: No edema. Skin:     General: Skin is warm and dry. Capillary Refill: Capillary refill takes less than 2 seconds. Neurological:      General: No focal deficit present. Mental Status: She is alert and oriented to person, place, and time. Sensory: No sensory deficit. Motor: No weakness.    Psychiatric:         Mood and Affect: Mood normal.          Additional Data:     Labs:  Results from last 7 days   Lab Units 23  0537 23  1423   WBC Thousand/uL 13.83* 16.75* HEMOGLOBIN g/dL 11.7 13.3   HEMATOCRIT % 35.9 40.2   PLATELETS Thousands/uL 192 193   NEUTROS PCT %  --  83*   LYMPHS PCT %  --  10*   MONOS PCT %  --  5   EOS PCT %  --  1     Results from last 7 days   Lab Units 09/30/23  0537 09/29/23  1423   SODIUM mmol/L 138 137   POTASSIUM mmol/L 3.9 3.8   CHLORIDE mmol/L 106 103   CO2 mmol/L 24 23   BUN mg/dL 12 11   CREATININE mg/dL 0.59* 0.68   ANION GAP mmol/L 8 11   CALCIUM mg/dL 9.0 9.5   ALBUMIN g/dL  --  4.3   TOTAL BILIRUBIN mg/dL  --  0.59   ALK PHOS U/L  --  86   ALT U/L  --  13   AST U/L  --  14   GLUCOSE RANDOM mg/dL 144* 117     Results from last 7 days   Lab Units 09/29/23  1504   INR  0.97             Results from last 7 days   Lab Units 09/30/23  0537 09/29/23  1504 09/29/23  1423   LACTIC ACID mmol/L  --  1.4  --    PROCALCITONIN ng/ml <0.05  --  <0.05       Lines/Drains:  Invasive Devices     Peripheral Intravenous Line  Duration           Peripheral IV 09/29/23 Right;Ventral (anterior) Forearm <1 day                      Imaging: Personally reviewed the following imaging: chest CT scan    Recent Cultures (last 7 days):   Results from last 7 days   Lab Units 09/29/23  1504   BLOOD CULTURE  Received in Microbiology Lab. Culture in Progress. Received in Microbiology Lab. Culture in Progress.        Last 24 Hours Medication List:   Current Facility-Administered Medications   Medication Dose Route Frequency Provider Last Rate   • acetaminophen  650 mg Oral Q6H PRN Tito Pokl DO     • albuterol  2 puff Inhalation Q6H PRN Loni Maloney MD     • ARIPiprazole  10 mg Oral HS Loni Maloney MD     • ARIPiprazole  2 mg Oral Daily Loni Maloney MD     • atorvastatin  20 mg Oral Daily Loni Maloney MD     • azelastine  1 spray Each Nare BID Loni Maloney MD     • busPIRone  30 mg Oral Daily Loni Maloney MD     • cefdinir  300 mg Oral Q12H Gilford Galla, MD     • chlorhexidine  15 mL Swish & Spit HS Marcia Urena MD     • cholecalciferol  2,000 Units Oral Daily Loni Maloney MD     • diphenhydrAMINE  25 mg Oral Q6H PRN Loni Maloney MD     • enoxaparin  40 mg Subcutaneous Daily Loni Maloney MD     • FLUoxetine  10 mg Oral QAM Loni Maloney MD     • fluticasone  1 spray Each Nare BID Loni Maloney MD     • fluticasone  1 puff Inhalation Daily Loni Maloney MD     • guaiFENesin  600 mg Oral Q12H [address] [place] [address]     • ipratropium  0.5 mg Nebulization TID Neil Townsend MD     • levalbuterol  1.25 mg Nebulization TID Neil Townsend MD     • LORazepam  0.5 mg Oral BID PRN Loni Maloney MD     • montelukast  10 mg Oral HS Loni Maloney MD     • saccharomyces boulardii  250 mg Oral BID Belem Adame MD          Today, Patient Was Seen By: Belem Adame MD    **Please Note: This note may have been constructed using a voice recognition system. ** never

## 2023-11-09 NOTE — OB RN PATIENT PROFILE - NSICDXPASTSURGICALHX_GEN_ALL_CORE_FT
PAST SURGICAL HISTORY:  No significant past surgical history      Rinvoq Pregnancy And Lactation Text: Based on animal studies, Rinvoq may cause embryo-fetal harm when administered to pregnant women.  The medication should not be used in pregnancy.  Breastfeeding is not recommended during treatment and for 6 days after the last dose.

## 2024-10-04 NOTE — OB PROVIDER TRIAGE NOTE - NSOBPROC_OBGYN_ALL_OB
10/04/24                            Wilian Gould  [address]  [facility] [address]    To Whom It May Concern:    This is to certify Wilian Gould was evaluated with Corona Maya PA-C on 10/04/24 and can return to  on 10/7/2024.     RESTRICTIONS: None.            Electronically signed by:  Corona Maya PA-C  [facility] [address]  [address] [facility] [address]  Dept Phone: [phone]       
Unknown at This Time

## 2024-12-16 ENCOUNTER — APPOINTMENT (OUTPATIENT)
Facility: CLINIC | Age: 34
End: 2024-12-16

## 2024-12-16 VITALS — SYSTOLIC BLOOD PRESSURE: 110 MMHG | DIASTOLIC BLOOD PRESSURE: 66 MMHG

## 2024-12-16 DIAGNOSIS — N76.2 ACUTE VULVITIS: ICD-10-CM

## 2024-12-16 DIAGNOSIS — N76.0 ACUTE VAGINITIS: ICD-10-CM

## 2024-12-16 LAB — HCG UR QL: NEGATIVE

## 2024-12-16 PROCEDURE — 99459 PELVIC EXAMINATION: CPT

## 2024-12-16 PROCEDURE — 81025 URINE PREGNANCY TEST: CPT

## 2024-12-16 PROCEDURE — 99204 OFFICE O/P NEW MOD 45 MIN: CPT

## 2024-12-16 RX ORDER — NYSTATIN AND TRIAMCINOLONE ACETONIDE 100000; 1 [USP'U]/G; MG/G
100000-0.1 OINTMENT TOPICAL TWICE DAILY
Qty: 1 | Refills: 1 | Status: ACTIVE | COMMUNITY
Start: 2024-12-16 | End: 1900-01-01

## 2024-12-16 RX ORDER — FLUCONAZOLE 150 MG/1
150 TABLET ORAL
Qty: 1 | Refills: 1 | Status: ACTIVE | COMMUNITY
Start: 2024-12-16 | End: 1900-01-01

## 2024-12-18 LAB
C TRACH RRNA SPEC QL NAA+PROBE: NOT DETECTED
N GONORRHOEA RRNA SPEC QL NAA+PROBE: NOT DETECTED
SOURCE AMPLIFICATION: NORMAL

## 2024-12-19 LAB
BV BACTERIA RRNA VAG QL NAA+PROBE: NOT DETECTED
C GLABRATA RNA VAG QL NAA+PROBE: NOT DETECTED
CANDIDA RRNA VAG QL PROBE: DETECTED
T VAGINALIS RRNA SPEC QL NAA+PROBE: NOT DETECTED

## 2025-01-14 ENCOUNTER — NON-APPOINTMENT (OUTPATIENT)
Age: 35
End: 2025-01-14

## 2025-01-14 DIAGNOSIS — Z98.890 OTHER SPECIFIED POSTPROCEDURAL STATES: ICD-10-CM

## 2025-01-14 DIAGNOSIS — Z78.9 OTHER SPECIFIED HEALTH STATUS: ICD-10-CM

## 2025-01-15 ENCOUNTER — APPOINTMENT (OUTPATIENT)
Facility: CLINIC | Age: 35
End: 2025-01-15

## 2025-01-15 VITALS — SYSTOLIC BLOOD PRESSURE: 118 MMHG | DIASTOLIC BLOOD PRESSURE: 75 MMHG

## 2025-01-15 DIAGNOSIS — N81.10 CYSTOCELE, UNSPECIFIED: ICD-10-CM

## 2025-01-15 DIAGNOSIS — N92.0 EXCESSIVE AND FREQUENT MENSTRUATION WITH REGULAR CYCLE: ICD-10-CM

## 2025-01-15 DIAGNOSIS — Z01.419 ENCOUNTER FOR GYNECOLOGICAL EXAMINATION (GENERAL) (ROUTINE) W/OUT ABNORMAL FINDINGS: ICD-10-CM

## 2025-01-15 DIAGNOSIS — N81.6 RECTOCELE: ICD-10-CM

## 2025-01-15 LAB — HCG UR QL: NEGATIVE

## 2025-01-15 PROCEDURE — 76830 TRANSVAGINAL US NON-OB: CPT

## 2025-01-15 PROCEDURE — 99395 PREV VISIT EST AGE 18-39: CPT | Mod: 25

## 2025-01-15 PROCEDURE — 99459 PELVIC EXAMINATION: CPT

## 2025-01-15 PROCEDURE — 81025 URINE PREGNANCY TEST: CPT

## 2025-01-15 PROCEDURE — 76376 3D RENDER W/INTRP POSTPROCES: CPT

## 2025-01-15 PROCEDURE — 99213 OFFICE O/P EST LOW 20 MIN: CPT | Mod: 25

## 2025-01-17 LAB — HPV HIGH+LOW RISK DNA PNL CVX: NOT DETECTED

## 2025-01-22 LAB — CYTOLOGY CVX/VAG DOC THIN PREP: NORMAL

## 2025-02-04 RX ORDER — NORETHINDRONE ACETATE 5 MG/1
5 TABLET ORAL
Qty: 60 | Refills: 1 | Status: ACTIVE | COMMUNITY
Start: 2025-02-04 | End: 1900-01-01

## 2025-02-24 RX ORDER — TERCONAZOLE 8 MG/G
0.8 CREAM VAGINAL
Qty: 1 | Refills: 0 | Status: ACTIVE | COMMUNITY
Start: 2025-02-24 | End: 1900-01-01

## 2025-07-21 ENCOUNTER — APPOINTMENT (OUTPATIENT)
Facility: CLINIC | Age: 35
End: 2025-07-21
Payer: MEDICAID

## 2025-07-21 VITALS — DIASTOLIC BLOOD PRESSURE: 71 MMHG | SYSTOLIC BLOOD PRESSURE: 106 MMHG

## 2025-07-21 DIAGNOSIS — N81.6 RECTOCELE: ICD-10-CM

## 2025-07-21 DIAGNOSIS — N92.6 IRREGULAR MENSTRUATION, UNSPECIFIED: ICD-10-CM

## 2025-07-21 DIAGNOSIS — N92.0 EXCESSIVE AND FREQUENT MENSTRUATION WITH REGULAR CYCLE: ICD-10-CM

## 2025-07-21 DIAGNOSIS — Z00.00 ENCOUNTER FOR GENERAL ADULT MEDICAL EXAMINATION W/OUT ABNORMAL FINDINGS: ICD-10-CM

## 2025-07-21 LAB — HCG UR QL: NEGATIVE

## 2025-07-21 PROCEDURE — 99214 OFFICE O/P EST MOD 30 MIN: CPT

## 2025-07-21 PROCEDURE — 81025 URINE PREGNANCY TEST: CPT

## 2025-07-21 PROCEDURE — 99459 PELVIC EXAMINATION: CPT

## 2025-07-21 RX ORDER — NORETHINDRONE ACETATE 5 MG/1
5 TABLET ORAL DAILY
Qty: 1 | Refills: 1 | Status: ACTIVE | COMMUNITY
Start: 2025-07-21 | End: 1900-01-01